# Patient Record
Sex: FEMALE | Race: OTHER | HISPANIC OR LATINO | URBAN - METROPOLITAN AREA
[De-identification: names, ages, dates, MRNs, and addresses within clinical notes are randomized per-mention and may not be internally consistent; named-entity substitution may affect disease eponyms.]

---

## 2018-01-17 ENCOUNTER — EMERGENCY (EMERGENCY)
Facility: HOSPITAL | Age: 58
LOS: 1 days | Discharge: HOME | End: 2018-01-17
Attending: EMERGENCY MEDICINE | Admitting: EMERGENCY MEDICINE
Payer: MEDICAID

## 2018-01-17 VITALS
RESPIRATION RATE: 18 BRPM | HEART RATE: 87 BPM | WEIGHT: 158.07 LBS | OXYGEN SATURATION: 100 % | TEMPERATURE: 98 F | DIASTOLIC BLOOD PRESSURE: 74 MMHG | SYSTOLIC BLOOD PRESSURE: 119 MMHG

## 2018-01-17 VITALS
RESPIRATION RATE: 18 BRPM | DIASTOLIC BLOOD PRESSURE: 86 MMHG | HEART RATE: 86 BPM | OXYGEN SATURATION: 100 % | SYSTOLIC BLOOD PRESSURE: 123 MMHG | TEMPERATURE: 98 F

## 2018-01-17 PROCEDURE — 99283 EMERGENCY DEPT VISIT LOW MDM: CPT

## 2018-01-17 RX ADMIN — Medication 40 MILLIGRAM(S): at 18:10

## 2018-01-17 NOTE — ED ADULT NURSE NOTE - CHPI ED SYMPTOMS NEG
no shortness of breath/no swelling of face, tongue/no difficulty swallowing/no throat itching/no difficulty breathing/no nausea/no vomiting/no cough/no wheezing

## 2018-01-17 NOTE — ED ADULT NURSE NOTE - OBJECTIVE STATEMENT
58 y/o female, a&o x3, c/o itchy red rash on abdomen x 1 week. Patient states she had a similar rash in 3 months prior which resolved. This episode began 1 week prior after changing to Dove body soap. States only known allergy are to cherries and peaches. Denies headache, weakness, dizziness, nausea, vomiting, fevers, chills, difficulty swallowing, or SOB. Breathing even, full, unlabored, bilat lungs clear, no stridor. Abdomen soft, nontender, nondistended. Diffuse red rash of abdomen, under breasts, medial forearms, anterior thighs, and groin which patient describes as being very itchy. Patient resting on stretcher. Advised of plan of care.

## 2018-01-17 NOTE — ED PROVIDER NOTE - MEDICAL DECISION MAKING DETAILS
Allergic reaction of unknown etiology. Oral steroids. He stopped topical steroids. Patient will be referred to allergy and immunology for further evaluation. Patient will be prescribed oral steroids for 5 days. She may continue taking Benadryl by mouth.

## 2018-01-17 NOTE — ED ADULT TRIAGE NOTE - CHIEF COMPLAINT QUOTE
abd redness/itchiness - no new meds, no change in lifestyle - denies CP/SOB/diff breathing - no resp distress noted, airway patent, pt speaking coherently

## 2018-01-17 NOTE — ED PROVIDER NOTE - OBJECTIVE STATEMENT
Attending note. Patient was seen in the fast track room #5. This is complaining of Hg rash on her abdomen and under her breasts and chest for approximately 2 weeks. Patient had similar episode in November which was treated with steroids. Patient has been applying cortisone cream as well as using Caladryl and taking Benadryl by mouth. Patient denies any respiratory symptoms. Patient has no joint pain, recent travel or ill contacts.

## 2018-01-17 NOTE — ED PROVIDER NOTE - PHYSICAL EXAMINATION
Attending note. Patient is alert and in no acute distress. Scalp is normal. Back is normal. This has erythema regular rash over the anterior chest and abdomen as well as underneath the breasts bilaterally. There is also rash on the arms bilaterally and proximal thighs. Lungs are clear and equal bilaterally. There are no wheezes rales or rhonchi. Oropharynx is normal. There is no adenopathy. Pupils are 3 mm equal reactive.

## 2018-01-31 ENCOUNTER — APPOINTMENT (OUTPATIENT)
Dept: PEDIATRIC ALLERGY IMMUNOLOGY | Facility: CLINIC | Age: 58
End: 2018-01-31
Payer: MEDICAID

## 2018-01-31 VITALS
SYSTOLIC BLOOD PRESSURE: 120 MMHG | WEIGHT: 168.13 LBS | BODY MASS INDEX: 36.27 KG/M2 | OXYGEN SATURATION: 98 % | HEART RATE: 81 BPM | DIASTOLIC BLOOD PRESSURE: 83 MMHG | HEIGHT: 57 IN

## 2018-01-31 DIAGNOSIS — T78.1XXA OTHER ADVERSE FOOD REACTIONS, NOT ELSEWHERE CLASSIFIED, INITIAL ENCOUNTER: ICD-10-CM

## 2018-01-31 DIAGNOSIS — J30.9 ALLERGIC RHINITIS, UNSPECIFIED: ICD-10-CM

## 2018-01-31 PROCEDURE — 95004 PERQ TESTS W/ALRGNC XTRCS: CPT

## 2018-01-31 PROCEDURE — 99204 OFFICE O/P NEW MOD 45 MIN: CPT | Mod: 25

## 2018-02-05 PROBLEM — T78.1XXA ORAL ALLERGY SYNDROME: Status: ACTIVE | Noted: 2018-02-05

## 2018-02-07 ENCOUNTER — APPOINTMENT (OUTPATIENT)
Dept: DERMATOLOGY | Facility: CLINIC | Age: 58
End: 2018-02-07
Payer: MEDICAID

## 2018-02-07 VITALS
HEIGHT: 57 IN | SYSTOLIC BLOOD PRESSURE: 112 MMHG | BODY MASS INDEX: 36.03 KG/M2 | DIASTOLIC BLOOD PRESSURE: 70 MMHG | WEIGHT: 167 LBS

## 2018-02-07 DIAGNOSIS — L30.4 ERYTHEMA INTERTRIGO: ICD-10-CM

## 2018-02-07 DIAGNOSIS — Z91.89 OTHER SPECIFIED PERSONAL RISK FACTORS, NOT ELSEWHERE CLASSIFIED: ICD-10-CM

## 2018-02-07 DIAGNOSIS — Z56.0 UNEMPLOYMENT, UNSPECIFIED: ICD-10-CM

## 2018-02-07 PROCEDURE — 99202 OFFICE O/P NEW SF 15 MIN: CPT

## 2018-02-07 SDOH — ECONOMIC STABILITY - INCOME SECURITY: UNEMPLOYMENT, UNSPECIFIED: Z56.0

## 2018-11-07 ENCOUNTER — OUTPATIENT (OUTPATIENT)
Dept: OUTPATIENT SERVICES | Facility: HOSPITAL | Age: 58
LOS: 1 days | End: 2018-11-07
Payer: COMMERCIAL

## 2018-11-07 ENCOUNTER — APPOINTMENT (OUTPATIENT)
Dept: INTERNAL MEDICINE | Facility: CLINIC | Age: 58
End: 2018-11-07
Payer: COMMERCIAL

## 2018-11-07 VITALS
DIASTOLIC BLOOD PRESSURE: 70 MMHG | SYSTOLIC BLOOD PRESSURE: 120 MMHG | WEIGHT: 152 LBS | BODY MASS INDEX: 32.79 KG/M2 | HEIGHT: 57 IN

## 2018-11-07 DIAGNOSIS — Z09 ENCOUNTER FOR FOLLOW-UP EXAMINATION AFTER COMPLETED TREATMENT FOR CONDITIONS OTHER THAN MALIGNANT NEOPLASM: ICD-10-CM

## 2018-11-07 DIAGNOSIS — I10 ESSENTIAL (PRIMARY) HYPERTENSION: ICD-10-CM

## 2018-11-07 PROCEDURE — 99213 OFFICE O/P EST LOW 20 MIN: CPT | Mod: GE

## 2018-11-07 RX ORDER — CETIRIZINE HYDROCHLORIDE 10 MG/1
10 TABLET, FILM COATED ORAL DAILY
Qty: 30 | Refills: 3 | Status: DISCONTINUED | COMMUNITY
Start: 2018-01-31 | End: 2018-11-07

## 2018-11-07 RX ORDER — KETOTIFEN FUMARATE 0.25 MG/ML
0.03 SOLUTION/ DROPS OPHTHALMIC
Qty: 1 | Refills: 3 | Status: DISCONTINUED | COMMUNITY
Start: 2018-02-05 | End: 2018-11-07

## 2018-11-07 RX ORDER — TRIAMCINOLONE ACETONIDE 1 MG/G
0.1 CREAM TOPICAL TWICE DAILY
Qty: 1 | Refills: 3 | Status: DISCONTINUED | COMMUNITY
Start: 2018-01-31 | End: 2018-11-07

## 2018-11-07 RX ORDER — PREDNISONE 50 MG/1
TABLET ORAL
Refills: 0 | Status: DISCONTINUED | COMMUNITY
End: 2018-11-07

## 2018-11-07 RX ORDER — FLUTICASONE PROPIONATE 50 UG/1
50 SPRAY, METERED NASAL DAILY
Qty: 1 | Refills: 3 | Status: DISCONTINUED | COMMUNITY
Start: 2018-01-31 | End: 2018-11-07

## 2018-11-07 NOTE — HISTORY OF PRESENT ILLNESS
[de-identified] : 57 y/o F w/ no sig PMHx presents for her CPE. \par \par Patient reports she came in to get her usual annual physical, but also reports concern about a 15lb weight loss over the last 2-3 months. Patient reports eating better but is concerned about diabetes because it runs on her family. Also reports drinking about 8 cups of water a day which is more than her usual. \par Saw Dermatology and AI for rash and allergies symptoms respectively. Derm diagnosed rash as intertrigo in Feb. Rash still comes and goes in her groin, but has overall improved. \par Denies any F/C, CP, change in energy.

## 2018-11-07 NOTE — HEALTH RISK ASSESSMENT
[Two or more falls in past year] : Patient reported two or more falls in the past year [0] : 2) Feeling down, depressed, or hopeless: Not at all (0) [With Family] : lives with family [Employed] : employed [# Of Children ___] : has [unfilled] children [Good] : ~his/her~  mood as  good [] : No [[id number]] : 0 [Reports changes in hearing] : Reports no changes in hearing [Reports changes in vision] : Reports no changes in vision [Reports changes in dental health] : Reports no changes in dental health [de-identified] : c [FreeTextEntry2] :

## 2018-11-07 NOTE — PHYSICAL EXAM
[No Acute Distress] : no acute distress [Well Nourished] : well nourished [Well Developed] : well developed [Well-Appearing] : well-appearing [Normal Sclera/Conjunctiva] : normal sclera/conjunctiva [Normal Outer Ear/Nose] : the outer ears and nose were normal in appearance [Normal Oropharynx] : the oropharynx was normal [No Respiratory Distress] : no respiratory distress  [Clear to Auscultation] : lungs were clear to auscultation bilaterally [No Accessory Muscle Use] : no accessory muscle use [Normal Rate] : normal rate  [Regular Rhythm] : with a regular rhythm [Normal S1, S2] : normal S1 and S2 [No Murmur] : no murmur heard [No Carotid Bruits] : no carotid bruits [No Abdominal Bruit] : a ~M bruit was not heard ~T in the abdomen [No Varicosities] : no varicosities [Pedal Pulses Present] : the pedal pulses are present [No Edema] : there was no peripheral edema [No Extremity Clubbing/Cyanosis] : no extremity clubbing/cyanosis [No Palpable Aorta] : no palpable aorta [Soft] : abdomen soft [Non Tender] : non-tender [Non-distended] : non-distended [Normal Bowel Sounds] : normal bowel sounds [No CVA Tenderness] : no CVA  tenderness [No Spinal Tenderness] : no spinal tenderness [No Joint Swelling] : no joint swelling [Grossly Normal Strength/Tone] : grossly normal strength/tone [No Rash] : no rash [Normal Gait] : normal gait [Coordination Grossly Intact] : coordination grossly intact [No Focal Deficits] : no focal deficits [Normal Affect] : the affect was normal [Alert and Oriented x3] : oriented to person, place, and time [Normal Insight/Judgement] : insight and judgment were intact

## 2018-11-07 NOTE — REVIEW OF SYSTEMS
[Itching] : Itching [Skin Rash] : skin rash [Fever] : no fever [Chills] : no chills [Fatigue] : no fatigue [Night Sweats] : no night sweats [Discharge] : no discharge [Pain] : no pain [Earache] : no earache [Hearing Loss] : no hearing loss [Sore Throat] : no sore throat [Chest Pain] : no chest pain [Palpitations] : no palpitations [Leg Claudication] : no leg claudication [Lower Ext Edema] : no lower extremity edema [Orthopnea] : no orthopnea [Shortness Of Breath] : no shortness of breath [Wheezing] : no wheezing [Cough] : no cough [Abdominal Pain] : no abdominal pain [Nausea] : no nausea [Constipation] : no constipation [Vomiting] : no vomiting [Heartburn] : no heartburn [Dysuria] : no dysuria [Incontinence] : no incontinence [Hematuria] : no hematuria [Joint Pain] : no joint pain [Joint Stiffness] : no joint stiffness [Muscle Pain] : no muscle pain [Headache] : no headache [Dizziness] : no dizziness [Fainting] : no fainting

## 2018-11-08 ENCOUNTER — RESULT REVIEW (OUTPATIENT)
Age: 58
End: 2018-11-08

## 2018-11-08 LAB
ALBUMIN SERPL ELPH-MCNC: 4.1 G/DL
ALP BLD-CCNC: 83 U/L
ALT SERPL-CCNC: 26 U/L
ANION GAP SERPL CALC-SCNC: 13 MMOL/L
AST SERPL-CCNC: 16 U/L
BASOPHILS # BLD AUTO: 0.06 K/UL
BASOPHILS NFR BLD AUTO: 0.9 %
BILIRUB SERPL-MCNC: 0.4 MG/DL
BUN SERPL-MCNC: 14 MG/DL
CALCIUM SERPL-MCNC: 9.7 MG/DL
CHLORIDE SERPL-SCNC: 99 MMOL/L
CHOLEST SERPL-MCNC: 209 MG/DL
CHOLEST/HDLC SERPL: 3.3 RATIO
CO2 SERPL-SCNC: 28 MMOL/L
CREAT SERPL-MCNC: 0.54 MG/DL
EOSINOPHIL # BLD AUTO: 0.38 K/UL
EOSINOPHIL NFR BLD AUTO: 5.9 %
GLUCOSE SERPL-MCNC: 276 MG/DL
HBA1C MFR BLD HPLC: 12.1 %
HCT VFR BLD CALC: 46.5 %
HDLC SERPL-MCNC: 64 MG/DL
HGB BLD-MCNC: 15.8 G/DL
IMM GRANULOCYTES NFR BLD AUTO: 0.2 %
LDLC SERPL CALC-MCNC: 132 MG/DL
LYMPHOCYTES # BLD AUTO: 2.88 K/UL
LYMPHOCYTES NFR BLD AUTO: 45.1 %
MAN DIFF?: NORMAL
MCHC RBC-ENTMCNC: 29.6 PG
MCHC RBC-ENTMCNC: 34 GM/DL
MCV RBC AUTO: 87.2 FL
MONOCYTES # BLD AUTO: 0.51 K/UL
MONOCYTES NFR BLD AUTO: 8 %
NEUTROPHILS # BLD AUTO: 2.55 K/UL
NEUTROPHILS NFR BLD AUTO: 39.9 %
PLATELET # BLD AUTO: 273 K/UL
POTASSIUM SERPL-SCNC: 4.5 MMOL/L
PROT SERPL-MCNC: 6.3 G/DL
RBC # BLD: 5.33 M/UL
RBC # FLD: 12.8 %
SODIUM SERPL-SCNC: 140 MMOL/L
TRIGL SERPL-MCNC: 64 MG/DL
WBC # FLD AUTO: 6.39 K/UL

## 2018-11-08 RX ORDER — INSULIN GLARGINE 100 [IU]/ML
100 INJECTION, SOLUTION SUBCUTANEOUS
Qty: 3 | Refills: 1 | Status: DISCONTINUED | COMMUNITY
Start: 2018-11-07 | End: 2018-11-08

## 2018-11-09 RX ORDER — INSULIN LISPRO 100 [IU]/ML
100 INJECTION, SOLUTION INTRAVENOUS; SUBCUTANEOUS
Qty: 2 | Refills: 1 | Status: DISCONTINUED | COMMUNITY
Start: 2018-11-07 | End: 2018-11-09

## 2018-11-12 DIAGNOSIS — E11.65 TYPE 2 DIABETES MELLITUS WITH HYPERGLYCEMIA: ICD-10-CM

## 2018-11-12 DIAGNOSIS — E66.9 OBESITY, UNSPECIFIED: ICD-10-CM

## 2018-11-13 ENCOUNTER — LABORATORY RESULT (OUTPATIENT)
Age: 58
End: 2018-11-13

## 2018-11-13 ENCOUNTER — APPOINTMENT (OUTPATIENT)
Dept: INTERNAL MEDICINE | Facility: CLINIC | Age: 58
End: 2018-11-13

## 2018-11-13 PROCEDURE — 82274 ASSAY TEST FOR BLOOD FECAL: CPT

## 2018-11-13 PROCEDURE — G0463: CPT

## 2018-11-14 ENCOUNTER — APPOINTMENT (OUTPATIENT)
Dept: INTERNAL MEDICINE | Facility: CLINIC | Age: 58
End: 2018-11-14
Payer: COMMERCIAL

## 2018-11-14 ENCOUNTER — OUTPATIENT (OUTPATIENT)
Dept: OUTPATIENT SERVICES | Facility: HOSPITAL | Age: 58
LOS: 1 days | End: 2018-11-14
Payer: COMMERCIAL

## 2018-11-14 VITALS
HEIGHT: 57 IN | WEIGHT: 152 LBS | DIASTOLIC BLOOD PRESSURE: 70 MMHG | BODY MASS INDEX: 32.79 KG/M2 | SYSTOLIC BLOOD PRESSURE: 110 MMHG

## 2018-11-14 DIAGNOSIS — I10 ESSENTIAL (PRIMARY) HYPERTENSION: ICD-10-CM

## 2018-11-14 PROCEDURE — 82962 GLUCOSE BLOOD TEST: CPT

## 2018-11-14 PROCEDURE — G0463: CPT

## 2018-11-14 PROCEDURE — 99214 OFFICE O/P EST MOD 30 MIN: CPT | Mod: GC

## 2018-11-16 LAB — GLUCOSE BLDC GLUCOMTR-MCNC: 135

## 2018-11-20 NOTE — HISTORY OF PRESENT ILLNESS
[FreeTextEntry8] : Pt is a 57yo F with DM (on metformin, basaglar 16units qhs, Admelog 6 units TID), last A1c >12 November 2018) presenting for blood work results and FS check. No complaints at present.

## 2018-11-20 NOTE — PHYSICAL EXAM

## 2018-11-20 NOTE — PLAN
[FreeTextEntry1] : Diabetes\par - FS was 135 today\par - FS range 70s-100s at home after breakfast\par - continue with metformin 500mg BID\par - c/w basaglar 18unites at bedtime\par - c/w admelog 6 units TID\par - return in 5 weeks for FS check\par \par Fungal infection foot\par - given ketoconazole cream\par \par Hyperlipidemia\par - started on atorvastatin 10mg QD

## 2018-11-20 NOTE — ASSESSMENT
[FreeTextEntry1] : Pt is a 59yo F with DM (on metformin, last A1c >14 November 2018) presenting for blood work results and FS check. No complaints at present.

## 2018-11-23 DIAGNOSIS — H10.13 ACUTE ATOPIC CONJUNCTIVITIS, BILATERAL: ICD-10-CM

## 2018-11-23 DIAGNOSIS — E11.65 TYPE 2 DIABETES MELLITUS WITH HYPERGLYCEMIA: ICD-10-CM

## 2018-11-23 DIAGNOSIS — E78.5 HYPERLIPIDEMIA, UNSPECIFIED: ICD-10-CM

## 2018-11-26 LAB — OB PNL STL IA: NEGATIVE — SIGNIFICANT CHANGE UP

## 2018-12-11 ENCOUNTER — APPOINTMENT (OUTPATIENT)
Dept: INTERNAL MEDICINE | Facility: CLINIC | Age: 58
End: 2018-12-11

## 2018-12-12 ENCOUNTER — RESULT CHARGE (OUTPATIENT)
Age: 58
End: 2018-12-12

## 2018-12-12 ENCOUNTER — APPOINTMENT (OUTPATIENT)
Dept: INTERNAL MEDICINE | Facility: CLINIC | Age: 58
End: 2018-12-12
Payer: COMMERCIAL

## 2018-12-12 ENCOUNTER — OUTPATIENT (OUTPATIENT)
Dept: OUTPATIENT SERVICES | Facility: HOSPITAL | Age: 58
LOS: 1 days | End: 2018-12-12
Payer: COMMERCIAL

## 2018-12-12 VITALS
DIASTOLIC BLOOD PRESSURE: 70 MMHG | WEIGHT: 152 LBS | BODY MASS INDEX: 32.79 KG/M2 | SYSTOLIC BLOOD PRESSURE: 110 MMHG | HEIGHT: 57 IN

## 2018-12-12 DIAGNOSIS — I10 ESSENTIAL (PRIMARY) HYPERTENSION: ICD-10-CM

## 2018-12-12 LAB — GLUCOSE BLDC GLUCOMTR-MCNC: 81

## 2018-12-12 PROCEDURE — G0463: CPT

## 2018-12-12 PROCEDURE — 99213 OFFICE O/P EST LOW 20 MIN: CPT | Mod: GE

## 2018-12-12 RX ORDER — HYDROCORTISONE 25 MG/G
2.5 CREAM TOPICAL
Qty: 2 | Refills: 2 | Status: DISCONTINUED | COMMUNITY
Start: 2018-02-07 | End: 2018-12-12

## 2018-12-12 RX ORDER — KETOCONAZOLE 20 MG/G
2 CREAM TOPICAL
Qty: 1 | Refills: 2 | Status: DISCONTINUED | COMMUNITY
Start: 2018-02-07 | End: 2018-12-12

## 2018-12-13 NOTE — REVIEW OF SYSTEMS
[Fever] : no fever [Chills] : no chills [Fatigue] : no fatigue [Night Sweats] : no night sweats [Discharge] : no discharge [Pain] : no pain [Earache] : no earache [Hearing Loss] : no hearing loss [Sore Throat] : no sore throat [Chest Pain] : no chest pain [Palpitations] : no palpitations [Leg Claudication] : no leg claudication [Lower Ext Edema] : no lower extremity edema [Orthopnea] : no orthopnea [Shortness Of Breath] : no shortness of breath [Wheezing] : no wheezing [Cough] : no cough [Abdominal Pain] : no abdominal pain [Nausea] : no nausea [Constipation] : no constipation [Vomiting] : no vomiting [Heartburn] : no heartburn [Dysuria] : no dysuria [Incontinence] : no incontinence [Hematuria] : no hematuria [Joint Pain] : no joint pain [Joint Stiffness] : no joint stiffness [Muscle Pain] : no muscle pain [Itching] : no itching [Skin Rash] : no skin rash [Headache] : no headache [Dizziness] : no dizziness [Fainting] : no fainting

## 2018-12-13 NOTE — HISTORY OF PRESENT ILLNESS
[de-identified] : 59yo F with DM (on metformin, basaglar 16units qhs, Admelog 6 units TID), last A1c >12 November 2018) presenting for DM2 f/u. \par \par Checking FS at home. Yesterday was 108. Has been in low 100s usually but has had one reading in 200s. \par Patient lost her Admelog pen so hasn't used her premeal insulin for last 2 days. \par Has log book at home but hasn't been using it. \par Denies any symptoms of hypoglycemia. No N/V headache or dizziness. \par Reports energy has improved and is urinating less frequently. \par \par Fungal foot infection has improved with cream. Requesting refill.

## 2018-12-13 NOTE — PLAN
[FreeTextEntry1] : 57yo F with DM (on metformin, basaglar 16units qhs, Admelog 6 units TID), last A1c >12 November 2018) presenting for DM2 f/u. \par \par #DM2\par - FS was 81 today\par - given low FS off of premeal, concern for hypoglycemia at home given her diet changes\par - on metformin 500mg BID, will increase to 1gr BID\par - on basaglar 18unites at bedtime, will lower to 12u\par - on admelog 6 units TID, will lower to 2u or if FS>150 will take 4u\par - return in 2 weeks for FS check\par - instructed her to bring in log of FS next visit \par \par #Fungal infection foot\par - given ketoconazole cream last visit\par - gave refill today\par \par #Hyperlipidemia\par - started on atorvastatin 10mg QD, tolerating well \par \par D/w Dr. Alvarado and Dr. Martin\par RTC in 5 weeks

## 2018-12-19 ENCOUNTER — APPOINTMENT (OUTPATIENT)
Dept: INTERNAL MEDICINE | Facility: CLINIC | Age: 58
End: 2018-12-19
Payer: COMMERCIAL

## 2018-12-19 ENCOUNTER — OUTPATIENT (OUTPATIENT)
Dept: OUTPATIENT SERVICES | Facility: HOSPITAL | Age: 58
LOS: 1 days | End: 2018-12-19
Payer: COMMERCIAL

## 2018-12-19 VITALS
SYSTOLIC BLOOD PRESSURE: 120 MMHG | HEIGHT: 57 IN | BODY MASS INDEX: 32.79 KG/M2 | DIASTOLIC BLOOD PRESSURE: 70 MMHG | WEIGHT: 152 LBS

## 2018-12-19 DIAGNOSIS — I10 ESSENTIAL (PRIMARY) HYPERTENSION: ICD-10-CM

## 2018-12-19 DIAGNOSIS — E11.65 TYPE 2 DIABETES MELLITUS WITH HYPERGLYCEMIA: ICD-10-CM

## 2018-12-19 PROCEDURE — 99214 OFFICE O/P EST MOD 30 MIN: CPT | Mod: GC

## 2018-12-19 PROCEDURE — G0463: CPT

## 2018-12-20 LAB — GLUCOSE BLDC GLUCOMTR-MCNC: 203

## 2018-12-27 DIAGNOSIS — E11.65 TYPE 2 DIABETES MELLITUS WITH HYPERGLYCEMIA: ICD-10-CM

## 2019-01-06 NOTE — ASSESSMENT
[FreeTextEntry1] : Pt is a 59yo F with DM (now on metformin 1000 BID, basaglar 4 units qhs, Admelog 2 units TID - changed since November visit), last A1c >12 November 2018) presenting for FS check.

## 2019-01-06 NOTE — HISTORY OF PRESENT ILLNESS
[FreeTextEntry8] : Pt is a 59yo F with DM (now on metformin 1000 BID, basaglar 4 units qhs, Admelog 2 units TID - changed since November visit), last A1c >12 November 2018) presenting for FS check. No complaints at present. Patient states pre-meal FS in AM is 140s, Lunchtime 90s, Dinner time pre-meal 140s, and 90s after dinner. FS today in office - 203

## 2019-01-06 NOTE — PLAN
[FreeTextEntry1] : Diabetes\par -  in office\par - FS range within acceptable range\par - continue current regimen of basaglar \par - Increase Admelog to 4 units TID for premeal FS >140, otherwise c/w 2 units\par - continue to check FS TID and log in journal\par - return in January for followup visit\par

## 2019-01-08 ENCOUNTER — APPOINTMENT (OUTPATIENT)
Dept: INTERNAL MEDICINE | Facility: CLINIC | Age: 59
End: 2019-01-08

## 2019-01-16 ENCOUNTER — OUTPATIENT (OUTPATIENT)
Dept: OUTPATIENT SERVICES | Facility: HOSPITAL | Age: 59
LOS: 1 days | End: 2019-01-16
Payer: COMMERCIAL

## 2019-01-16 ENCOUNTER — APPOINTMENT (OUTPATIENT)
Dept: INTERNAL MEDICINE | Facility: CLINIC | Age: 59
End: 2019-01-16
Payer: COMMERCIAL

## 2019-01-16 VITALS
DIASTOLIC BLOOD PRESSURE: 70 MMHG | WEIGHT: 152 LBS | BODY MASS INDEX: 32.79 KG/M2 | HEIGHT: 57 IN | SYSTOLIC BLOOD PRESSURE: 120 MMHG

## 2019-01-16 DIAGNOSIS — E78.5 HYPERLIPIDEMIA, UNSPECIFIED: ICD-10-CM

## 2019-01-16 DIAGNOSIS — I10 ESSENTIAL (PRIMARY) HYPERTENSION: ICD-10-CM

## 2019-01-16 DIAGNOSIS — R73.03 PREDIABETES.: ICD-10-CM

## 2019-01-16 DIAGNOSIS — E66.9 OBESITY, UNSPECIFIED: ICD-10-CM

## 2019-01-16 DIAGNOSIS — E11.65 TYPE 2 DIABETES MELLITUS WITH HYPERGLYCEMIA: ICD-10-CM

## 2019-01-16 LAB — HBA1C MFR BLD HPLC: 7.2

## 2019-01-16 PROCEDURE — G0463: CPT

## 2019-01-16 PROCEDURE — 99213 OFFICE O/P EST LOW 20 MIN: CPT | Mod: GE

## 2019-01-16 NOTE — PHYSICAL EXAM
[No Acute Distress] : no acute distress [Well Nourished] : well nourished [Well Developed] : well developed [Well-Appearing] : well-appearing [Normal Sclera/Conjunctiva] : normal sclera/conjunctiva [Normal Outer Ear/Nose] : the outer ears and nose were normal in appearance [Normal Oropharynx] : the oropharynx was normal [No Respiratory Distress] : no respiratory distress  [Clear to Auscultation] : lungs were clear to auscultation bilaterally [No Accessory Muscle Use] : no accessory muscle use [Normal Rate] : normal rate  [Regular Rhythm] : with a regular rhythm [Normal S1, S2] : normal S1 and S2 [No Murmur] : no murmur heard [No Carotid Bruits] : no carotid bruits [No Abdominal Bruit] : a ~M bruit was not heard ~T in the abdomen [No Varicosities] : no varicosities [Pedal Pulses Present] : the pedal pulses are present [No Edema] : there was no peripheral edema [No Extremity Clubbing/Cyanosis] : no extremity clubbing/cyanosis [No Palpable Aorta] : no palpable aorta [Soft] : abdomen soft [Non Tender] : non-tender [Non-distended] : non-distended [Normal Bowel Sounds] : normal bowel sounds [No CVA Tenderness] : no CVA  tenderness [No Spinal Tenderness] : no spinal tenderness [No Joint Swelling] : no joint swelling [Grossly Normal Strength/Tone] : grossly normal strength/tone [No Rash] : no rash [Normal Gait] : normal gait [Coordination Grossly Intact] : coordination grossly intact [No Focal Deficits] : no focal deficits [Normal Affect] : the affect was normal [Alert and Oriented x3] : oriented to person, place, and time [Normal Insight/Judgement] : insight and judgment were intact [Comprehensive Foot Exam Normal] : Right and left foot were examined and both feet are normal. No ulcers in either foot. Toes are normal and with full ROM.  Normal tactile sensation with monofilament testing throughout both feet

## 2019-01-23 NOTE — PLAN
[FreeTextEntry1] : 57 y/o F with DM2 last A1c >12 November 2018) presenting for DM2 f/u. \par \par #DM2\par - A1c 7.2 today\par - c/w metformin 1gr BID\par - will stop basaglar\par - instructed to take 2 u admelog if FS is greater than 150\par - instructed her to bring in log of FS next visit \par \par #Hyperlipidemia\par - c/w atorvastatin 10mg QD, tolerating well \par \par #HCM\par - declined flu shot \par \par D/w Dr. Keenan \par RTC in 5 weeks

## 2019-01-23 NOTE — HISTORY OF PRESENT ILLNESS
[de-identified] : 57 y/o F with DM2, last A1c >12 November 2018) presenting for DM2 f/u. \par \par Patient reports lower blood sugars, so she hasn't been taking her insulin regularly. \par Reports using her basal only about 3-4 times since her last visit. Reports only taking 4 units. \par Premeal she has only taken 3 or 4 times since last visit. Took 2 units. \par Reports her blood sugars have been well controlled off of insulin. Highest blood sugar was 160 this morning. Says she has been checking twice a days. Blood sugars have been in low 100s or 90s. \par No symptoms or episodes of hypoglycemia. \par She has been taking her metformin daily.

## 2019-02-20 ENCOUNTER — LABORATORY RESULT (OUTPATIENT)
Age: 59
End: 2019-02-20

## 2019-02-20 ENCOUNTER — OUTPATIENT (OUTPATIENT)
Dept: OUTPATIENT SERVICES | Facility: HOSPITAL | Age: 59
LOS: 1 days | End: 2019-02-20
Payer: COMMERCIAL

## 2019-02-20 ENCOUNTER — APPOINTMENT (OUTPATIENT)
Dept: INTERNAL MEDICINE | Facility: CLINIC | Age: 59
End: 2019-02-20
Payer: COMMERCIAL

## 2019-02-20 VITALS
HEIGHT: 57 IN | WEIGHT: 146 LBS | SYSTOLIC BLOOD PRESSURE: 120 MMHG | DIASTOLIC BLOOD PRESSURE: 70 MMHG | BODY MASS INDEX: 31.5 KG/M2

## 2019-02-20 DIAGNOSIS — I10 ESSENTIAL (PRIMARY) HYPERTENSION: ICD-10-CM

## 2019-02-20 LAB — GLUCOSE BLDC GLUCOMTR-MCNC: 105

## 2019-02-20 PROCEDURE — 82043 UR ALBUMIN QUANTITATIVE: CPT

## 2019-02-20 PROCEDURE — 99213 OFFICE O/P EST LOW 20 MIN: CPT | Mod: GE

## 2019-02-20 PROCEDURE — G0463: CPT

## 2019-02-20 RX ORDER — INSULIN GLARGINE 100 [IU]/ML
100 INJECTION, SOLUTION SUBCUTANEOUS
Qty: 3 | Refills: 2 | Status: DISCONTINUED | COMMUNITY
Start: 2018-11-08 | End: 2019-02-20

## 2019-02-21 LAB
CREAT ?TM UR-MCNC: 108 MG/DL — SIGNIFICANT CHANGE UP
MICROALBUMIN UR-MCNC: <1.2 MG/DL — SIGNIFICANT CHANGE UP
MICROALBUMIN/CREAT UR-RTO: SIGNIFICANT CHANGE UP MG/G (ref 0–30)

## 2019-02-22 NOTE — PLAN
[FreeTextEntry1] : 57 y/o F with DM2 last A1c >12 November 2018) presenting for DM2 f/u. \par \par #DM2\par - A1c improved to 7.2\par - c/w metformin 1gr BID\par - instructed to take 2 u admelog if FS is greater than 150\par \par #Hyperlipidemia\par - c/w atorvastatin 10mg QD, tolerating well \par \par #HCM\par - declined flu shot \par - gave new referrals for dentist, GYN, mammography, optho\par - filled out DMV form and mail to patient and also faxed copy to DMV\par \par D/w Dr. Foreman \par RTC in 2 months
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
## 2019-02-26 DIAGNOSIS — E11.65 TYPE 2 DIABETES MELLITUS WITH HYPERGLYCEMIA: ICD-10-CM

## 2019-05-01 ENCOUNTER — OUTPATIENT (OUTPATIENT)
Dept: OUTPATIENT SERVICES | Facility: HOSPITAL | Age: 59
LOS: 1 days | End: 2019-05-01
Payer: COMMERCIAL

## 2019-05-01 ENCOUNTER — RESULT CHARGE (OUTPATIENT)
Age: 59
End: 2019-05-01

## 2019-05-01 ENCOUNTER — APPOINTMENT (OUTPATIENT)
Dept: INTERNAL MEDICINE | Facility: CLINIC | Age: 59
End: 2019-05-01
Payer: COMMERCIAL

## 2019-05-01 VITALS
WEIGHT: 148 LBS | BODY MASS INDEX: 31.93 KG/M2 | HEART RATE: 90 BPM | SYSTOLIC BLOOD PRESSURE: 115 MMHG | OXYGEN SATURATION: 97 % | DIASTOLIC BLOOD PRESSURE: 80 MMHG | HEIGHT: 57 IN

## 2019-05-01 DIAGNOSIS — I10 ESSENTIAL (PRIMARY) HYPERTENSION: ICD-10-CM

## 2019-05-01 DIAGNOSIS — Z87.19 PERSONAL HISTORY OF OTHER DISEASES OF THE DIGESTIVE SYSTEM: ICD-10-CM

## 2019-05-01 DIAGNOSIS — H10.13 ACUTE ATOPIC CONJUNCTIVITIS, BILATERAL: ICD-10-CM

## 2019-05-01 DIAGNOSIS — Z87.2 PERSONAL HISTORY OF DISEASES OF THE SKIN AND SUBCUTANEOUS TISSUE: ICD-10-CM

## 2019-05-01 LAB — HBA1C MFR BLD HPLC: 6.8

## 2019-05-01 PROCEDURE — G0463: CPT

## 2019-05-01 PROCEDURE — 90732 PPSV23 VACC 2 YRS+ SUBQ/IM: CPT

## 2019-05-01 PROCEDURE — 99213 OFFICE O/P EST LOW 20 MIN: CPT | Mod: GE

## 2019-05-01 PROCEDURE — 83036 HEMOGLOBIN GLYCOSYLATED A1C: CPT

## 2019-05-01 PROCEDURE — G0009: CPT

## 2019-05-01 NOTE — PHYSICAL EXAM
[Well Developed] : well developed [No Acute Distress] : no acute distress [Well Nourished] : well nourished [Well-Appearing] : well-appearing [Normal Sclera/Conjunctiva] : normal sclera/conjunctiva [Normal Outer Ear/Nose] : the outer ears and nose were normal in appearance [Normal Oropharynx] : the oropharynx was normal [Clear to Auscultation] : lungs were clear to auscultation bilaterally [No Respiratory Distress] : no respiratory distress  [No Accessory Muscle Use] : no accessory muscle use [Regular Rhythm] : with a regular rhythm [Normal Rate] : normal rate  [Normal S1, S2] : normal S1 and S2 [No Carotid Bruits] : no carotid bruits [No Murmur] : no murmur heard [No Abdominal Bruit] : a ~M bruit was not heard ~T in the abdomen [No Varicosities] : no varicosities [Pedal Pulses Present] : the pedal pulses are present [No Palpable Aorta] : no palpable aorta [No Extremity Clubbing/Cyanosis] : no extremity clubbing/cyanosis [Soft] : abdomen soft [No Edema] : there was no peripheral edema [Non Tender] : non-tender [Normal Bowel Sounds] : normal bowel sounds [Non-distended] : non-distended [No CVA Tenderness] : no CVA  tenderness [No Spinal Tenderness] : no spinal tenderness [No Rash] : no rash [No Joint Swelling] : no joint swelling [Grossly Normal Strength/Tone] : grossly normal strength/tone [Normal Gait] : normal gait [Coordination Grossly Intact] : coordination grossly intact [Alert and Oriented x3] : oriented to person, place, and time [Normal Insight/Judgement] : insight and judgment were intact [Normal Affect] : the affect was normal [No Focal Deficits] : no focal deficits

## 2019-05-07 NOTE — HISTORY OF PRESENT ILLNESS
[de-identified] : 57 y/o F with DM2, (A1c >12 11/2018 now improved to 7.2 1/2019) presenting for DM2 f/u. \par \par Patient reports feeling well. She reports 2 episodes of FS greater than 200 since last visit, but otherwise FS have been well controlled in low 100s. Patient brought in log which showed controlled FS but occasional evening FS ranging to 140s to 160s. Also reports one episode of FS of 62 in the morning but she was asymptomatic. \par \par Patient also reports her mother passed away recently 2/2 complications from diabetes. She reports doing ok.

## 2019-05-07 NOTE — REVIEW OF SYSTEMS
[Fever] : no fever [Fatigue] : no fatigue [Chills] : no chills [Pain] : no pain [Night Sweats] : no night sweats [Discharge] : no discharge [Hearing Loss] : no hearing loss [Earache] : no earache [Sore Throat] : no sore throat [Chest Pain] : no chest pain [Palpitations] : no palpitations [Leg Claudication] : no leg claudication [Lower Ext Edema] : no lower extremity edema [Orthopnea] : no orthopnea [Cough] : no cough [Shortness Of Breath] : no shortness of breath [Wheezing] : no wheezing [Nausea] : no nausea [Abdominal Pain] : no abdominal pain [Vomiting] : no vomiting [Constipation] : no constipation [Heartburn] : no heartburn [Dysuria] : no dysuria [Incontinence] : no incontinence [Hematuria] : no hematuria [Joint Pain] : no joint pain [Joint Stiffness] : no joint stiffness [Muscle Pain] : no muscle pain [Itching] : no itching [Skin Rash] : no skin rash [Headache] : no headache [Dizziness] : no dizziness [Fainting] : no fainting

## 2019-05-07 NOTE — PLAN
[FreeTextEntry1] : 57 y/o F with DM2 last A1c >12 November 2018) presenting for DM2 f/u. \par \par #DM2\par - A1c improved to 6.8\par - c/w metformin 1gr BID\par - patient was taking 2u admelog if FS is greater than 200, but explained she didn't need to anymore \par \par #Hyperlipidemia\par - c/w atorvastatin 10mg QD, tolerating well \par \par #Obesity \par - discussed diet and exercise \par \par #HCM\par - pneumococcal 23 vaccination today \par \par D/w Dr. Keenan \par RTC in 3 months

## 2019-05-09 DIAGNOSIS — E11.65 TYPE 2 DIABETES MELLITUS WITH HYPERGLYCEMIA: ICD-10-CM

## 2019-05-09 DIAGNOSIS — E66.9 OBESITY, UNSPECIFIED: ICD-10-CM

## 2021-03-16 ENCOUNTER — NON-APPOINTMENT (OUTPATIENT)
Age: 61
End: 2021-03-16

## 2021-03-16 RX ORDER — INSULIN LISPRO 100 U/ML
100 INJECTION, SOLUTION SUBCUTANEOUS
Qty: 1 | Refills: 3 | Status: DISCONTINUED | COMMUNITY
Start: 2018-11-09 | End: 2021-03-16

## 2021-03-30 ENCOUNTER — LABORATORY RESULT (OUTPATIENT)
Age: 61
End: 2021-03-30

## 2021-03-30 ENCOUNTER — OUTPATIENT (OUTPATIENT)
Dept: OUTPATIENT SERVICES | Facility: HOSPITAL | Age: 61
LOS: 1 days | End: 2021-03-30
Payer: MEDICAID

## 2021-03-30 ENCOUNTER — APPOINTMENT (OUTPATIENT)
Dept: INTERNAL MEDICINE | Facility: CLINIC | Age: 61
End: 2021-03-30
Payer: MEDICAID

## 2021-03-30 VITALS
OXYGEN SATURATION: 98 % | HEART RATE: 80 BPM | SYSTOLIC BLOOD PRESSURE: 132 MMHG | WEIGHT: 150 LBS | BODY MASS INDEX: 32.36 KG/M2 | DIASTOLIC BLOOD PRESSURE: 80 MMHG | HEIGHT: 57 IN

## 2021-03-30 DIAGNOSIS — I10 ESSENTIAL (PRIMARY) HYPERTENSION: ICD-10-CM

## 2021-03-30 PROCEDURE — 82306 VITAMIN D 25 HYDROXY: CPT

## 2021-03-30 PROCEDURE — 80061 LIPID PANEL: CPT

## 2021-03-30 PROCEDURE — 99396 PREV VISIT EST AGE 40-64: CPT | Mod: GC

## 2021-03-30 PROCEDURE — 86803 HEPATITIS C AB TEST: CPT

## 2021-03-30 PROCEDURE — 87086 URINE CULTURE/COLONY COUNT: CPT

## 2021-03-30 PROCEDURE — 87800 DETECT AGNT MULT DNA DIREC: CPT

## 2021-03-30 PROCEDURE — 84443 ASSAY THYROID STIM HORMONE: CPT

## 2021-03-30 PROCEDURE — 81001 URINALYSIS AUTO W/SCOPE: CPT

## 2021-03-30 PROCEDURE — 80053 COMPREHEN METABOLIC PANEL: CPT

## 2021-03-30 PROCEDURE — G0463: CPT

## 2021-03-30 PROCEDURE — 85027 COMPLETE CBC AUTOMATED: CPT

## 2021-03-30 PROCEDURE — 83036 HEMOGLOBIN GLYCOSYLATED A1C: CPT

## 2021-03-30 RX ORDER — KETOCONAZOLE 20 MG/G
2 CREAM TOPICAL TWICE DAILY
Qty: 1 | Refills: 2 | Status: DISCONTINUED | COMMUNITY
Start: 2018-11-14 | End: 2021-03-30

## 2021-03-30 RX ORDER — ATORVASTATIN CALCIUM 10 MG/1
10 TABLET, FILM COATED ORAL DAILY
Qty: 90 | Refills: 1 | Status: DISCONTINUED | COMMUNITY
Start: 2018-11-14 | End: 2021-03-30

## 2021-03-31 ENCOUNTER — LABORATORY RESULT (OUTPATIENT)
Age: 61
End: 2021-03-31

## 2021-03-31 LAB
24R-OH-CALCIDIOL SERPL-MCNC: 28.5 NG/ML — LOW (ref 30–80)
ALBUMIN SERPL ELPH-MCNC: 4.1 G/DL — SIGNIFICANT CHANGE UP (ref 3.3–5)
ALP SERPL-CCNC: 99 U/L — SIGNIFICANT CHANGE UP (ref 40–120)
ALT FLD-CCNC: 27 U/L — SIGNIFICANT CHANGE UP (ref 10–45)
ANION GAP SERPL CALC-SCNC: 12 MMOL/L — SIGNIFICANT CHANGE UP (ref 5–17)
APPEARANCE UR: ABNORMAL
AST SERPL-CCNC: 20 U/L — SIGNIFICANT CHANGE UP (ref 10–40)
BACTERIA # UR AUTO: NEGATIVE — SIGNIFICANT CHANGE UP
BILIRUB SERPL-MCNC: 0.2 MG/DL — SIGNIFICANT CHANGE UP (ref 0.2–1.2)
BILIRUB UR-MCNC: NEGATIVE — SIGNIFICANT CHANGE UP
BUN SERPL-MCNC: 23 MG/DL — SIGNIFICANT CHANGE UP (ref 7–23)
CALCIUM SERPL-MCNC: 10 MG/DL — SIGNIFICANT CHANGE UP (ref 8.4–10.5)
CANDIDA AB TITR SER: SIGNIFICANT CHANGE UP
CHLORIDE SERPL-SCNC: 100 MMOL/L — SIGNIFICANT CHANGE UP (ref 96–108)
CHOLEST SERPL-MCNC: 225 MG/DL — HIGH
CO2 SERPL-SCNC: 25 MMOL/L — SIGNIFICANT CHANGE UP (ref 22–31)
COD CRY URNS QL: ABNORMAL
COLOR SPEC: YELLOW — SIGNIFICANT CHANGE UP
CREAT SERPL-MCNC: 0.46 MG/DL — LOW (ref 0.5–1.3)
DIFF PNL FLD: NEGATIVE — SIGNIFICANT CHANGE UP
EPI CELLS # UR: 5 /HPF — SIGNIFICANT CHANGE UP (ref 0–5)
G VAGINALIS DNA SPEC QL NAA+PROBE: DETECTED
GLUCOSE SERPL-MCNC: 185 MG/DL — HIGH (ref 70–99)
GLUCOSE UR QL: ABNORMAL
HCT VFR BLD CALC: 49 % — HIGH (ref 34.5–45)
HCV AB S/CO SERPL IA: 0.05 S/CO — SIGNIFICANT CHANGE UP (ref 0–0.99)
HCV AB SERPL-IMP: SIGNIFICANT CHANGE UP
HDLC SERPL-MCNC: 61 MG/DL — SIGNIFICANT CHANGE UP
HGB BLD-MCNC: 16.3 G/DL — HIGH (ref 11.5–15.5)
HYALINE CASTS # UR AUTO: 0 /LPF — SIGNIFICANT CHANGE UP (ref 0–7)
KETONES UR-MCNC: ABNORMAL
LEUKOCYTE ESTERASE UR-ACNC: ABNORMAL
LIPID PNL WITH DIRECT LDL SERPL: 145 MG/DL — HIGH
MCHC RBC-ENTMCNC: 29.9 PG — SIGNIFICANT CHANGE UP (ref 27–34)
MCHC RBC-ENTMCNC: 33.3 GM/DL — SIGNIFICANT CHANGE UP (ref 32–36)
MCV RBC AUTO: 89.7 FL — SIGNIFICANT CHANGE UP (ref 80–100)
NITRITE UR-MCNC: NEGATIVE — SIGNIFICANT CHANGE UP
NON HDL CHOLESTEROL: 164 MG/DL — HIGH
PH UR: 5.5 — SIGNIFICANT CHANGE UP (ref 5–8)
PLATELET # BLD AUTO: 290 K/UL — SIGNIFICANT CHANGE UP (ref 150–400)
POTASSIUM SERPL-MCNC: 4.1 MMOL/L — SIGNIFICANT CHANGE UP (ref 3.5–5.3)
POTASSIUM SERPL-SCNC: 4.1 MMOL/L — SIGNIFICANT CHANGE UP (ref 3.5–5.3)
PROT SERPL-MCNC: 6 G/DL — SIGNIFICANT CHANGE UP (ref 6–8.3)
PROT UR-MCNC: ABNORMAL
RBC # BLD: 5.46 M/UL — HIGH (ref 3.8–5.2)
RBC # FLD: 12.3 % — SIGNIFICANT CHANGE UP (ref 10.3–14.5)
RBC CASTS # UR COMP ASSIST: 4 /HPF — SIGNIFICANT CHANGE UP (ref 0–4)
SODIUM SERPL-SCNC: 137 MMOL/L — SIGNIFICANT CHANGE UP (ref 135–145)
SP GR SPEC: 1.04 — HIGH (ref 1.01–1.02)
T VAGINALIS SPEC QL WET PREP: SIGNIFICANT CHANGE UP
T4 FREE+ TSH PNL SERPL: 2.84 UIU/ML — SIGNIFICANT CHANGE UP (ref 0.27–4.2)
TRIGL SERPL-MCNC: 92 MG/DL — SIGNIFICANT CHANGE UP
UROBILINOGEN FLD QL: SIGNIFICANT CHANGE UP
WBC # BLD: 7.64 K/UL — SIGNIFICANT CHANGE UP (ref 3.8–10.5)
WBC # FLD AUTO: 7.64 K/UL — SIGNIFICANT CHANGE UP (ref 3.8–10.5)
WBC UR QL: 7 /HPF — HIGH (ref 0–5)

## 2021-04-01 LAB
A1C WITH ESTIMATED AVERAGE GLUCOSE RESULT: 11.3 % — HIGH (ref 4–5.6)
ESTIMATED AVERAGE GLUCOSE: 278 MG/DL — HIGH (ref 68–114)

## 2021-04-01 NOTE — PLAN
[FreeTextEntry1] : \par 60 F with hx of T2DM, obesity, HLD, presenting to clinic for CPE with complaints of vaginal itching\par \par # Vaginal pruritis\par - Itching with dysuria/urgency that resolved. Reported skin erythema but none on exam. No vaginal discharge or lesions. Ddx: candidasis vs bacterial vaginosis. ?UTI ? atrophic vaginitis\par - Bacterial vaginosis swab collected \par - UA with microscopy and urine cx ordered\par - Gyn referral given\par - Advised to follow up if symptoms worsen\par \par # T2DM \par - POCT A1C >11%, patient with no complaints of neuropathy or vision changes \par - C/w metformin 1000mg BID for now, but will likely need additional medications pending lab results including serum A1c and renal function\par - Check microalbumin/Cr \par - yearly optho and podiatry screening advised\par - ADA diet counseled\par \par # HCM\par - Check routine labs: CBC, CMP, lipid panel, TSH, Vit D, Hep C\par - Gyn referral for pap test and for vaginal pruritis\par - Mammogram referral\par - FIT kit given\par - plans to get shingrix vaccine at pharmacy\par - RTC in 5 weeks \par \par Discussed with Dr. Richmond\par \par Dominik Soares, PGY-1

## 2021-04-01 NOTE — PHYSICAL EXAM
[External Female Genitalia] : normal external genitalia [No CVA Tenderness] : no CVA  tenderness [Normal] : normal gait, coordination grossly intact, no focal deficits and deep tendon reflexes were 2+ and symmetric [Normal Affect] : the affect was normal [Normal Mood] : the mood was normal [Normal Insight/Judgement] : insight and judgment were intact [de-identified] : No vaginal discharge, no vaginal skin lesions

## 2021-04-01 NOTE — HEALTH RISK ASSESSMENT
[1] : 1) Little interest or pleasure doing things for several days (1) [0] : 2) Feeling down, depressed, or hopeless: Not at all (0) [No] : No [] : No [[id number]] : 1

## 2021-04-01 NOTE — HISTORY OF PRESENT ILLNESS
[FreeTextEntry1] : 60 F presenting to clinic for CPE [de-identified] : 60 F with hx of DM2, obesity, HLD, presenting for CPE. Patient reports overall good health. Only complaint this visit was some vaginal pruritis that has been going on for 1 month. Patient reports itching in the vaginal area with some dysuria initially. Symptoms improved and now denies any dysuria, urgency, or hesitancy. Patient also noted some redness in the skin in the vaginal area and had used cream in the past for it, with mild improvement. No vaginal discharge or odors. Patient last saw gynecologist 3 years ago. Rest of ROS negative. Patient reports a decrease in her appetite but no weight loss/gain. No issues with sleep. \par

## 2021-04-02 DIAGNOSIS — E11.65 TYPE 2 DIABETES MELLITUS WITH HYPERGLYCEMIA: ICD-10-CM

## 2021-04-02 DIAGNOSIS — Z00.00 ENCOUNTER FOR GENERAL ADULT MEDICAL EXAMINATION WITHOUT ABNORMAL FINDINGS: ICD-10-CM

## 2021-04-02 DIAGNOSIS — N89.8 OTHER SPECIFIED NONINFLAMMATORY DISORDERS OF VAGINA: ICD-10-CM

## 2021-04-02 LAB
CULTURE RESULTS: SIGNIFICANT CHANGE UP
HBA1C MFR BLD HPLC: 11.3
SPECIMEN SOURCE: SIGNIFICANT CHANGE UP

## 2021-04-20 ENCOUNTER — LABORATORY RESULT (OUTPATIENT)
Age: 61
End: 2021-04-20

## 2021-04-21 ENCOUNTER — OUTPATIENT (OUTPATIENT)
Dept: OUTPATIENT SERVICES | Facility: HOSPITAL | Age: 61
LOS: 1 days | End: 2021-04-21
Payer: MEDICAID

## 2021-04-21 ENCOUNTER — NON-APPOINTMENT (OUTPATIENT)
Age: 61
End: 2021-04-21

## 2021-04-21 ENCOUNTER — APPOINTMENT (OUTPATIENT)
Dept: INTERNAL MEDICINE | Facility: CLINIC | Age: 61
End: 2021-04-21
Payer: MEDICAID

## 2021-04-21 DIAGNOSIS — I10 ESSENTIAL (PRIMARY) HYPERTENSION: ICD-10-CM

## 2021-04-21 PROCEDURE — G0463: CPT

## 2021-04-21 PROCEDURE — 99213 OFFICE O/P EST LOW 20 MIN: CPT | Mod: GE

## 2021-04-21 RX ORDER — INSULIN GLARGINE 100 [IU]/ML
100 INJECTION, SOLUTION SUBCUTANEOUS AT BEDTIME
Qty: 1 | Refills: 0 | Status: DISCONTINUED | COMMUNITY
Start: 2021-04-19 | End: 2021-04-21

## 2021-04-21 NOTE — PHYSICAL EXAM
[No Lymphadenopathy] : no lymphadenopathy [No Respiratory Distress] : no respiratory distress  [Clear to Auscultation] : lungs were clear to auscultation bilaterally [Normal] : normal rate, regular rhythm, normal S1 and S2 and no murmur heard [No Edema] : there was no peripheral edema [Soft] : abdomen soft [Non Tender] : non-tender [Normal Bowel Sounds] : normal bowel sounds [Coordination Grossly Intact] : coordination grossly intact [No Focal Deficits] : no focal deficits

## 2021-04-21 NOTE — HISTORY OF PRESENT ILLNESS
[FreeTextEntry1] : follow up diabetes  [de-identified] : The patient is a 60 year old woman with PMH DMT2, obesity and HLD here for follow up of diabetes. States she has been taking metformin as prescribed. Insulin was sent to her pharmacy on 4/19, patient states she was not aware it was sent so she has not yet started taking it. Has not been checking blood sugars at home, says she doesn't have strips or needles to check her blood sugar. \par Eats eggs with fruit for breakfast, fish for lunch with vegetables, and similar food for dinner also sometimes has beans. States she sometimes forgets to take the nighttime dose of metformin, maybe 3/7 days per week. Denies polydipsia/ polyuria/polyphagia. \par \par May 26th has an appointment with gynecology.States vaginal pruritus has improved with the use of metronidazole Still has some intermittent itching. Did fit test, came back negative.\par

## 2021-04-21 NOTE — ASSESSMENT
[FreeTextEntry1] : This is a 60 year old woman with PMH T2DM, obesity and HLD here for follow up of diabetes. \par \par #Diabetes\par -Continue metformin 1000mg BID\par -Start taking Basaglar 13 units daily, already sent to pharmacy\par -At next visit, plan to discuss alternative medications instead of insulin including GLP1/SGLT2. Pharmacy looked into coverage and Trulicity would not require prior authorization so would be a good option.\par -Strips and lancets sent to [facility]'s pharmacy, instructed to check sugars before breakfast and dinner \par -Follow up in 4 weeks\par \par #HCM\par -FIT test negative\par -has gyn appointment scheduled for may 26th\par -Will call to schedule mammogram, referral already placed\par \par \par Return to clinic in 4 weeks for follow up of diabetes

## 2021-04-21 NOTE — REVIEW OF SYSTEMS
[Fever] : no fever [Chills] : no chills [Fatigue] : no fatigue [Vision Problems] : no vision problems [Chest Pain] : no chest pain [Palpitations] : no palpitations [Shortness Of Breath] : no shortness of breath [Cough] : no cough [Headache] : no headache [Dizziness] : no dizziness

## 2021-04-29 DIAGNOSIS — E11.65 TYPE 2 DIABETES MELLITUS WITH HYPERGLYCEMIA: ICD-10-CM

## 2021-04-29 DIAGNOSIS — E78.5 HYPERLIPIDEMIA, UNSPECIFIED: ICD-10-CM

## 2021-05-26 ENCOUNTER — RESULT REVIEW (OUTPATIENT)
Age: 61
End: 2021-05-26

## 2021-05-26 ENCOUNTER — OUTPATIENT (OUTPATIENT)
Dept: OUTPATIENT SERVICES | Facility: HOSPITAL | Age: 61
LOS: 1 days | End: 2021-05-26
Payer: MEDICAID

## 2021-05-26 ENCOUNTER — LABORATORY RESULT (OUTPATIENT)
Age: 61
End: 2021-05-26

## 2021-05-26 ENCOUNTER — APPOINTMENT (OUTPATIENT)
Dept: OBGYN | Facility: CLINIC | Age: 61
End: 2021-05-26
Payer: MEDICAID

## 2021-05-26 VITALS — WEIGHT: 151 LBS | BODY MASS INDEX: 32.68 KG/M2 | SYSTOLIC BLOOD PRESSURE: 120 MMHG | DIASTOLIC BLOOD PRESSURE: 82 MMHG

## 2021-05-26 VITALS — TEMPERATURE: 97.1 F

## 2021-05-26 DIAGNOSIS — N95.1 MENOPAUSAL AND FEMALE CLIMACTERIC STATES: ICD-10-CM

## 2021-05-26 DIAGNOSIS — R14.0 ABDOMINAL DISTENSION (GASEOUS): ICD-10-CM

## 2021-05-26 DIAGNOSIS — B35.9 DERMATOPHYTOSIS, UNSPECIFIED: ICD-10-CM

## 2021-05-26 DIAGNOSIS — Z01.419 ENCOUNTER FOR GYNECOLOGICAL EXAMINATION (GENERAL) (ROUTINE) WITHOUT ABNORMAL FINDINGS: ICD-10-CM

## 2021-05-26 DIAGNOSIS — N76.0 ACUTE VAGINITIS: ICD-10-CM

## 2021-05-26 DIAGNOSIS — Z01.419 ENCOUNTER FOR GYNECOLOGICAL EXAMINATION (GENERAL) (ROUTINE) W/OUT ABNORMAL FINDINGS: ICD-10-CM

## 2021-05-26 PROCEDURE — G0463: CPT

## 2021-05-26 PROCEDURE — 87624 HPV HI-RISK TYP POOLED RSLT: CPT

## 2021-05-26 PROCEDURE — 99203 OFFICE O/P NEW LOW 30 MIN: CPT

## 2021-05-27 LAB — HPV HIGH+LOW RISK DNA PNL CVX: SIGNIFICANT CHANGE UP

## 2021-05-28 NOTE — PHYSICAL EXAM
[Appropriately responsive] : appropriately responsive [Alert] : alert [No Acute Distress] : no acute distress [No Lymphadenopathy] : no lymphadenopathy [Regular Rate Rhythm] : regular rate rhythm [No Murmurs] : no murmurs [Clear to Auscultation B/L] : clear to auscultation bilaterally [Soft] : soft [Non-tender] : non-tender [Non-distended] : non-distended [No HSM] : No HSM [No Lesions] : no lesions [No Mass] : no mass [Oriented x3] : oriented x3 [Examination Of The Breasts] : a normal appearance [No Masses] : no breast masses were palpable [Labia Majora] : normal [Labia Minora] : normal [Atrophy] : atrophy [Normal] : normal [Uterine Adnexae] : normal [FreeTextEntry6] : limited exam 2/2 body habitus

## 2021-05-28 NOTE — HISTORY OF PRESENT ILLNESS
[FreeTextEntry1] : 61 yo  (LMP 50yo) presents for annual exam.  Has not been seen since .  Denies pain/ pmb. +vague bloating.  Sex active- +vag dryness is an issue.  Declined STD screen.  Denies change in bowel or bladder habits.  \par \par - PAP -  neg  Denies abnl paps/ stds\par - Mammo 2015 Birads 1\par - Colonoscopy 2011 - rpt 7-10yrs\par \par \par Gen health followed by Medicine (DM/HLD)\par COVID vaccine- refused\par

## 2021-05-31 ENCOUNTER — RX RENEWAL (OUTPATIENT)
Age: 61
End: 2021-05-31

## 2021-06-10 ENCOUNTER — RESULT REVIEW (OUTPATIENT)
Age: 61
End: 2021-06-10

## 2021-06-10 ENCOUNTER — APPOINTMENT (OUTPATIENT)
Dept: ULTRASOUND IMAGING | Facility: IMAGING CENTER | Age: 61
End: 2021-06-10
Payer: MEDICAID

## 2021-06-10 ENCOUNTER — OUTPATIENT (OUTPATIENT)
Dept: OUTPATIENT SERVICES | Facility: HOSPITAL | Age: 61
LOS: 1 days | End: 2021-06-10
Payer: MEDICAID

## 2021-06-10 ENCOUNTER — APPOINTMENT (OUTPATIENT)
Dept: INTERNAL MEDICINE | Facility: CLINIC | Age: 61
End: 2021-06-10
Payer: MEDICAID

## 2021-06-10 ENCOUNTER — APPOINTMENT (OUTPATIENT)
Dept: MAMMOGRAPHY | Facility: IMAGING CENTER | Age: 61
End: 2021-06-10
Payer: MEDICAID

## 2021-06-10 ENCOUNTER — NON-APPOINTMENT (OUTPATIENT)
Age: 61
End: 2021-06-10

## 2021-06-10 ENCOUNTER — OUTPATIENT (OUTPATIENT)
Dept: OUTPATIENT SERVICES | Facility: HOSPITAL | Age: 61
LOS: 1 days | End: 2021-06-10

## 2021-06-10 VITALS
HEART RATE: 90 BPM | WEIGHT: 148 LBS | HEIGHT: 57 IN | SYSTOLIC BLOOD PRESSURE: 106 MMHG | OXYGEN SATURATION: 97 % | DIASTOLIC BLOOD PRESSURE: 70 MMHG | BODY MASS INDEX: 31.93 KG/M2

## 2021-06-10 DIAGNOSIS — N76.0 ACUTE VAGINITIS: ICD-10-CM

## 2021-06-10 DIAGNOSIS — I10 ESSENTIAL (PRIMARY) HYPERTENSION: ICD-10-CM

## 2021-06-10 DIAGNOSIS — R14.0 ABDOMINAL DISTENSION (GASEOUS): ICD-10-CM

## 2021-06-10 LAB
ALBUMIN: 30
CREATININE: 200
HBA1C MFR BLD HPLC: 9.6
MICROALBUMIN/CREAT UR TEST STR-RTO: <30

## 2021-06-10 PROCEDURE — 76856 US EXAM PELVIC COMPLETE: CPT | Mod: 26

## 2021-06-10 PROCEDURE — 76856 US EXAM PELVIC COMPLETE: CPT

## 2021-06-10 PROCEDURE — 77063 BREAST TOMOSYNTHESIS BI: CPT | Mod: 26

## 2021-06-10 PROCEDURE — 76830 TRANSVAGINAL US NON-OB: CPT

## 2021-06-10 PROCEDURE — G0463: CPT | Mod: 25

## 2021-06-10 PROCEDURE — 99213 OFFICE O/P EST LOW 20 MIN: CPT | Mod: GE

## 2021-06-10 PROCEDURE — 77067 SCR MAMMO BI INCL CAD: CPT | Mod: 26

## 2021-06-10 PROCEDURE — 77063 BREAST TOMOSYNTHESIS BI: CPT

## 2021-06-10 PROCEDURE — 76830 TRANSVAGINAL US NON-OB: CPT | Mod: 26

## 2021-06-10 PROCEDURE — 77067 SCR MAMMO BI INCL CAD: CPT

## 2021-06-10 PROCEDURE — 83036 HEMOGLOBIN GLYCOSYLATED A1C: CPT

## 2021-06-10 RX ORDER — BLOOD-GLUCOSE METER
KIT MISCELLANEOUS TWICE DAILY
Qty: 2 | Refills: 2 | Status: DISCONTINUED | COMMUNITY
Start: 2018-11-07 | End: 2021-06-10

## 2021-06-10 NOTE — REVIEW OF SYSTEMS
[Diarrhea] : diarrhea [Itching] : Itching [Fever] : no fever [Chills] : no chills [Vision Problems] : no vision problems [Chest Pain] : no chest pain [Shortness Of Breath] : no shortness of breath [Dyspnea on Exertion] : no dyspnea on exertion [Abdominal Pain] : no abdominal pain [Nausea] : no nausea [Vomiting] : no vomiting [Frequency] : no frequency [Headache] : no headache [de-identified] : Toes itching

## 2021-06-10 NOTE — COUNSELING
[Potential consequences of obesity discussed] : Potential consequences of obesity discussed [Benefits of weight loss discussed] : Benefits of weight loss discussed [Encouraged to increase physical activity] : Encouraged to increase physical activity [Decrease Portions] : decrease portions [Good understanding] : Patient has a good understanding of disease, goals and obesity follow-up plan [FreeTextEntry3] : Encouraged to run for 25-30 minutes twice weekly, and walk for 25-30 minutes on the other days.

## 2021-06-10 NOTE — PHYSICAL EXAM
[Normal Sclera/Conjunctiva] : normal sclera/conjunctiva [PERRL] : pupils equal round and reactive to light [EOMI] : extraocular movements intact [Normal] : normal rate, regular rhythm, normal S1 and S2 and no murmur heard [Pedal Pulses Present] : the pedal pulses are present [No Edema] : there was no peripheral edema [No Extremity Clubbing/Cyanosis] : no extremity clubbing/cyanosis [Soft] : abdomen soft [Non-distended] : non-distended [No Rash] : no rash [Normal Affect] : the affect was normal [Normal Insight/Judgement] : insight and judgment were intact [Right Foot Was Examined] : Right foot ~C was examined [Left Foot Was Examined] : left foot ~C was examined [None] : no ulcers in either foot were found [] : both feet [de-identified] : Obese [de-identified] : Cracking between toes of R foot. No bleeding, discharge, redness, or signs of infection. [TWNoteComboBox5] : False

## 2021-06-10 NOTE — HISTORY OF PRESENT ILLNESS
[FreeTextEntry1] : DM follow up [de-identified] : 60F PMHx T2DM on insulin, HLD, obesity presents for DM follow up. She reports overall feeling well. She has not been checking her FSGs because she does not have the correct test strips for her OneTouch Ultra 2 glucometer. She is concerned about her blood glucose going too low so she only injects insulin every other day. \par Reports 3 days of nonbloody diarrhea last week, which has resolved.\par \par Needs ophthalmology referral for DM.\par She reports she checks her toes each night, and reports that she gets itching and cracks between her right foot toes, without bleeding. She denies numbness or tingling in her toes.

## 2021-06-10 NOTE — HEALTH RISK ASSESSMENT
[1 or 2 (0 pts)] : 1 or 2 (0 points) [Never (0 pts)] : Never (0 points) [No] : In the past 12 months have you used drugs other than those required for medical reasons? No [No falls in past year] : Patient reported no falls in the past year [] : No [Audit-CScore] : 0

## 2021-06-10 NOTE — ASSESSMENT
[FreeTextEntry1] : 60F with PMHx  T2DM on insulin, HLD, obesity presents for DM follow up.\par \par RTC in 10 weeks for DM follow up\par D/w Dr. Foreman

## 2021-06-14 ENCOUNTER — NON-APPOINTMENT (OUTPATIENT)
Age: 61
End: 2021-06-14

## 2021-06-21 DIAGNOSIS — E66.9 OBESITY, UNSPECIFIED: ICD-10-CM

## 2021-06-21 DIAGNOSIS — E11.65 TYPE 2 DIABETES MELLITUS WITH HYPERGLYCEMIA: ICD-10-CM

## 2021-07-01 ENCOUNTER — LABORATORY RESULT (OUTPATIENT)
Age: 61
End: 2021-07-01

## 2021-07-01 ENCOUNTER — OUTPATIENT (OUTPATIENT)
Dept: OUTPATIENT SERVICES | Facility: HOSPITAL | Age: 61
LOS: 1 days | End: 2021-07-01
Payer: MEDICAID

## 2021-07-01 ENCOUNTER — APPOINTMENT (OUTPATIENT)
Dept: OBGYN | Facility: CLINIC | Age: 61
End: 2021-07-01
Payer: MEDICAID

## 2021-07-01 VITALS — SYSTOLIC BLOOD PRESSURE: 120 MMHG | WEIGHT: 151.5 LBS | BODY MASS INDEX: 32.78 KG/M2 | DIASTOLIC BLOOD PRESSURE: 78 MMHG

## 2021-07-01 DIAGNOSIS — R93.5 ABNORMAL FINDINGS ON DIAGNOSTIC IMAGING OF OTHER ABDOMINAL REGIONS, INCLUDING RETROPERITONEUM: ICD-10-CM

## 2021-07-01 DIAGNOSIS — N84.1 POLYP OF CERVIX UTERI: ICD-10-CM

## 2021-07-01 DIAGNOSIS — N76.0 ACUTE VAGINITIS: ICD-10-CM

## 2021-07-01 PROCEDURE — 58100 BIOPSY OF UTERUS LINING: CPT

## 2021-07-01 PROCEDURE — 58100 BIOPSY OF UTERUS LINING: CPT | Mod: NC

## 2021-07-01 PROCEDURE — 99213 OFFICE O/P EST LOW 20 MIN: CPT | Mod: 25

## 2021-07-01 PROCEDURE — G0463: CPT | Mod: 25

## 2021-07-02 NOTE — PROCEDURE
[Endometrial Biopsy] : Endometrial biopsy [Abnormal US] : abnormal ultrasound findings [Risks] : risks [Benefits] : benefits [Alternatives] : alternatives [Patient] : patient [Infection] : infection [Bleeding] : bleeding [Allergic Reaction] : allergic reaction [Uterine Perforation] : uterine perforation [CONSENT OBTAINED] : written consent was obtained prior to the procedure. [Betadine] : Betadine [None] : none [Required Dilation] : required dilation [Pipelle] : a Pipelle endometrial suction curette [Scant] : a scant [Sent to Histology] : the specimen was place in buffered formalin and sent for pathlogy [Tolerated Well] : the patient tolerated the procedure well [Pain] : pain [de-identified] : postmenopause [de-identified] : motrin 600mg po given at end of procedure

## 2021-07-12 DIAGNOSIS — N84.1 POLYP OF CERVIX UTERI: ICD-10-CM

## 2021-07-12 DIAGNOSIS — R93.5 ABNORMAL FINDINGS ON DIAGNOSTIC IMAGING OF OTHER ABDOMINAL REGIONS, INCLUDING RETROPERITONEUM: ICD-10-CM

## 2021-08-10 ENCOUNTER — APPOINTMENT (OUTPATIENT)
Dept: OPHTHALMOLOGY | Facility: CLINIC | Age: 61
End: 2021-08-10

## 2021-10-12 ENCOUNTER — OUTPATIENT (OUTPATIENT)
Dept: OUTPATIENT SERVICES | Facility: HOSPITAL | Age: 61
LOS: 1 days | End: 2021-10-12
Payer: MEDICAID

## 2021-10-12 ENCOUNTER — APPOINTMENT (OUTPATIENT)
Dept: GASTROENTEROLOGY | Facility: HOSPITAL | Age: 61
End: 2021-10-12
Payer: MEDICAID

## 2021-10-12 VITALS
TEMPERATURE: 97.8 F | WEIGHT: 155 LBS | RESPIRATION RATE: 16 BRPM | BODY MASS INDEX: 33.44 KG/M2 | HEART RATE: 91 BPM | SYSTOLIC BLOOD PRESSURE: 113 MMHG | HEIGHT: 57 IN | DIASTOLIC BLOOD PRESSURE: 78 MMHG

## 2021-10-12 DIAGNOSIS — R19.7 DIARRHEA, UNSPECIFIED: ICD-10-CM

## 2021-10-12 DIAGNOSIS — R10.9 UNSPECIFIED ABDOMINAL PAIN: ICD-10-CM

## 2021-10-12 DIAGNOSIS — Z12.11 ENCOUNTER FOR SCREENING FOR MALIGNANT NEOPLASM OF COLON: ICD-10-CM

## 2021-10-12 DIAGNOSIS — E66.9 OBESITY, UNSPECIFIED: ICD-10-CM

## 2021-10-12 PROCEDURE — G0463: CPT

## 2021-10-12 PROCEDURE — 99204 OFFICE O/P NEW MOD 45 MIN: CPT | Mod: GC

## 2021-10-12 NOTE — END OF VISIT
[] : Fellow [FreeTextEntry3] : Agree with above. Has mild reflux intermittently, no alarm symptoms. Will recommend lifestyle modifications and H2 blocker PRN. Will check stools studies for workup of diarrhea - plan for colonoscopy for workup of the diarrhea and as patient is due for colon cancer screening. [Time Spent: ___ minutes] : I have spent [unfilled] minutes of time on the encounter.

## 2021-10-12 NOTE — HISTORY OF PRESENT ILLNESS
[Wt Loss ___ Lbs] : no recent weight loss [de-identified] : 60F PMHx IDDM (A1c 9.6% 06/2021), HLD, obesity, choledocholithiasis s/p ERCP 2013 and cholecystecomy in 2013, who presents as a new pt to the GI clinic for a screening colonoscopy. \par \par Pt last had c-scope 10/2011 that showed diverticulosis in the proximal sigmoid colon, sigmoid angioectasia, no polyps. Recommended repeat c-scope in 7-10 years. \par \par Has intermittent diarrhea in the mornings after drinking coffee; started 1 month ago, no bloody stool; no recent abx use; worse after consuming dairy. Reports few months of heart burn 2x/week after eating, worse after eating garlic, tomatoes, acidic foods; not on any H2 blockers or PPI; takes TUMS on occasion which helps. No fever/chills, abdominal pain, N/V/C, melena/hematochezia, dysphagia/odynophagia. Has BM 3-4x/day with variable consistency. Has intentional weight loss 15 lbs in the last year. No night sweats. Sugars are better controlled than before, in the morning fasting glucose usually 120's.\par \par Denies ETOH or tobacco use, no marijuana use. \par Family hx- sister with stomach cancer; no family history of colon yenni\par \par Has been vaccinated for COVID.

## 2021-10-12 NOTE — ASSESSMENT
[FreeTextEntry1] : 60F PMHx IDDM (A1c 9.6% 06/2021), HLD, obesity, choledocholithiasis s/p ERCP 2013 and cholecystecomy in 2013, who presents as a new pt to the GI clinic for a screening colonoscopy. \par \par Pt last had c-scope 10/2011 that showed diveticulosis in the proximal sigmoid colon, sigmoid angioectasia, no polyps. Recommended repeat c-scope in 7-10 years. \par \par Has intermittent diarrhea in the mornings after drinking coffee; started 1 month ago, no bloody stool; no recent abx use; worse after consuming dairy. Reports few months of heart burn 2x/week after eating, worse after eating garlic, tomatoes, acidic foods; not on any H2 blockers or PPI; takes TUMS on occasion which helps. No fever/chills, abdominal pain, N/V/C, melena/hematochezia, dysphagia/odynophagia. Has BM 3-4x/day with variable consistency. Has intentional weight loss 15 lbs in the last year. No night sweats.\par \par Impression:\par #subacute intermittent diarrhea- differential includes infectious vs inflammatory etiology vs lactose intolerance or malabsorptive condition; lower c/f malignancy as pt does not have alarm sx; would benefit from diagnostic colonoscopy and stool studies as mentioned below\par #screening c-scope- last c-scope 10/2011; no polyps; due 10/2021\par #heartburn- chronic intermittent; worse with acidic foods\par \par \par Plan:\par - GI PCR, C diff PCR\par - diagnostic + screening c-scope; ordered Clenpiq prep; risks/benefits explained\par - trial off milk and dairy products\par - discussed weight loss and continued lifestyle modifications (avoiding spicy/acidic foods, eating late; avoiding tight clothing)\par - ordered pepcid 20 mg BID PRN for heartburn\par - Avoid late night meals, elevate head of bed\par \par #[phone] (pt's cell phone)\par \par Pt seen and d/w Dr. Valle.\par \par Ynes Daigle MD\par NS/[facility] GI Fellow, PGY-4\par

## 2021-10-12 NOTE — PHYSICAL EXAM
[General Appearance - Alert] : alert [General Appearance - In No Acute Distress] : in no acute distress [Extraocular Movements] : extraocular movements were intact [Hearing Threshold Finger Rub Not [place]] : hearing was normal [] : no respiratory distress [Heart Rate And Rhythm] : heart rate was normal and rhythm regular [Heart Sounds] : normal S1 and S2 [Bowel Sounds] : normal bowel sounds [Abdomen Soft] : soft [Abdomen Tenderness] : non-tender [Skin Color & Pigmentation] : normal skin color and pigmentation [Skin Turgor] : normal skin turgor [Cranial Nerves] : cranial nerves 2-12 were intact [Oriented To Time, Place, And Person] : oriented to person, place, and time [Impaired Insight] : insight and judgment were intact [Affect] : the affect was normal [Sclera] : the sclera and conjunctiva were normal [Outer Ear] : the ears and nose were normal in appearance [Neck Appearance] : the appearance of the neck was normal [Involuntary Movements] : no involuntary movements were seen

## 2021-10-12 NOTE — REVIEW OF SYSTEMS
[As Noted in HPI] : as noted in HPI [Fever] : no fever [Chills] : no chills [Eyesight Problems] : no eyesight problems [Loss Of Hearing] : no hearing loss [Negative] : Heme/Lymph

## 2021-10-13 DIAGNOSIS — Z12.11 ENCOUNTER FOR SCREENING FOR MALIGNANT NEOPLASM OF COLON: ICD-10-CM

## 2021-10-13 DIAGNOSIS — E66.9 OBESITY, UNSPECIFIED: ICD-10-CM

## 2021-10-13 DIAGNOSIS — R19.7 DIARRHEA, UNSPECIFIED: ICD-10-CM

## 2021-10-13 DIAGNOSIS — R12 HEARTBURN: ICD-10-CM

## 2021-10-18 ENCOUNTER — OUTPATIENT (OUTPATIENT)
Dept: OUTPATIENT SERVICES | Facility: HOSPITAL | Age: 61
LOS: 1 days | End: 2021-10-18
Payer: MEDICAID

## 2021-10-18 DIAGNOSIS — Z12.11 ENCOUNTER FOR SCREENING FOR MALIGNANT NEOPLASM OF COLON: ICD-10-CM

## 2021-10-18 DIAGNOSIS — E66.9 OBESITY, UNSPECIFIED: ICD-10-CM

## 2021-10-18 DIAGNOSIS — R12 HEARTBURN: ICD-10-CM

## 2021-10-18 DIAGNOSIS — R19.7 DIARRHEA, UNSPECIFIED: ICD-10-CM

## 2021-10-18 PROCEDURE — 87324 CLOSTRIDIUM AG IA: CPT

## 2021-10-18 PROCEDURE — 87507 IADNA-DNA/RNA PROBE TQ 12-25: CPT

## 2021-11-02 ENCOUNTER — NON-APPOINTMENT (OUTPATIENT)
Age: 61
End: 2021-11-02

## 2021-11-03 ENCOUNTER — OUTPATIENT (OUTPATIENT)
Dept: OUTPATIENT SERVICES | Facility: HOSPITAL | Age: 61
LOS: 1 days | End: 2021-11-03
Payer: MEDICAID

## 2021-11-03 ENCOUNTER — RESULT CHARGE (OUTPATIENT)
Age: 61
End: 2021-11-03

## 2021-11-03 ENCOUNTER — APPOINTMENT (OUTPATIENT)
Dept: INTERNAL MEDICINE | Facility: CLINIC | Age: 61
End: 2021-11-03
Payer: MEDICAID

## 2021-11-03 VITALS
OXYGEN SATURATION: 98 % | DIASTOLIC BLOOD PRESSURE: 70 MMHG | HEIGHT: 57 IN | BODY MASS INDEX: 31.5 KG/M2 | SYSTOLIC BLOOD PRESSURE: 100 MMHG | WEIGHT: 146 LBS | HEART RATE: 78 BPM

## 2021-11-03 DIAGNOSIS — I10 ESSENTIAL (PRIMARY) HYPERTENSION: ICD-10-CM

## 2021-11-03 DIAGNOSIS — L85.3 XEROSIS CUTIS: ICD-10-CM

## 2021-11-03 PROCEDURE — 83036 HEMOGLOBIN GLYCOSYLATED A1C: CPT

## 2021-11-03 PROCEDURE — 99213 OFFICE O/P EST LOW 20 MIN: CPT | Mod: GE

## 2021-11-03 PROCEDURE — G0463: CPT | Mod: 25

## 2021-11-03 PROCEDURE — T1013: CPT

## 2021-11-03 RX ORDER — PETROLATUM,WHITE 41 %
OINTMENT (GRAM) TOPICAL 3 TIMES DAILY
Qty: 1 | Refills: 0 | Status: ACTIVE | COMMUNITY
Start: 2021-11-03 | End: 1900-01-01

## 2021-11-03 RX ORDER — ELECTROLYTES/DEXTROSE
31G X 5 MM SOLUTION, ORAL ORAL
Qty: 2 | Refills: 1 | Status: COMPLETED | COMMUNITY
Start: 2018-11-07 | End: 2021-11-03

## 2021-11-03 RX ORDER — LANCETS 33 GAUGE
EACH MISCELLANEOUS
Qty: 2 | Refills: 3 | Status: COMPLETED | COMMUNITY
Start: 2018-11-07 | End: 2021-11-03

## 2021-11-03 RX ORDER — SEMAGLUTIDE 1.34 MG/ML
2 INJECTION, SOLUTION SUBCUTANEOUS
Qty: 1 | Refills: 3 | Status: COMPLETED | COMMUNITY
Start: 2021-11-03 | End: 2021-11-03

## 2021-11-15 NOTE — ASSESSMENT
[FreeTextEntry1] : 60 year old female PMH DM2, HLD, obesity, cholecystectomy presenting for a follow up for diabetes management \par \par #DM2\par - patient's A1C is 7.1% today and her finger sticks have been within range\par - will d/c insulin and continue with metformin \par - encouraged healthy diet and exercise \par \par #dry skin \par - will prescribe patient Eucerin for dry skin and continue to monitor \par \par Follow up in 5 weeks for further diabetes management

## 2021-11-15 NOTE — PHYSICAL EXAM
[Normal] : soft, non-tender, non-distended, no masses palpated, no HSM and normal bowel sounds [Comprehensive Foot Exam Normal] : Right and left foot were examined and both feet are normal. No ulcers in either foot. Toes are normal and with full ROM.  Normal tactile sensation with monofilament testing throughout both feet [de-identified] : right foot with dry skin

## 2021-11-15 NOTE — HISTORY OF PRESENT ILLNESS
[[employer] ] : provided by [employer]   [Time Spent: ____ minutes] : Total time spent using  services: [unfilled] minutes. The patient's primary language is not English thus required  services. [FreeTextEntry1] : Follow up for diabetes management  [Interpreters_IDNumber] : [id number] [Interpreters_FullName] : Murphy  [TWNoteComboBox1] : [place] [de-identified] : 60 year old female PMH DM2 (A1C 9.6% in June 2021), HLD, obesity, cholecystectomy presenting for a follow up. \par \par Patient states that since her last appointment she has been checking her blood sugar levels in the morning and two hours after her largest meal. Reports that her blood glucose levels have been 140s-160s both in the morning and afternoon. She takes metformin daily but only takes insulin 2-3 times per week because she is trying to wean herself off of insulin. She has been eating a healthier diet and losing weight. Denies polyuria and polydipsia. Patient also reports the skin under her right foot is dry but that she has intact sensation. \par \par

## 2021-11-15 NOTE — REVIEW OF SYSTEMS
[Fever] : no fever [Chills] : no chills [Fatigue] : no fatigue [Chest Pain] : no chest pain [Shortness Of Breath] : no shortness of breath [Abdominal Pain] : no abdominal pain [Nausea] : no nausea [Constipation] : no constipation [Diarrhea] : diarrhea [Vomiting] : no vomiting [Nocturia] : no nocturia

## 2021-11-16 DIAGNOSIS — L85.3 XEROSIS CUTIS: ICD-10-CM

## 2021-11-16 DIAGNOSIS — E11.65 TYPE 2 DIABETES MELLITUS WITH HYPERGLYCEMIA: ICD-10-CM

## 2021-11-22 ENCOUNTER — OUTPATIENT (OUTPATIENT)
Dept: OUTPATIENT SERVICES | Facility: HOSPITAL | Age: 61
LOS: 1 days | End: 2021-11-22
Payer: MEDICAID

## 2021-11-22 DIAGNOSIS — Z11.52 ENCOUNTER FOR SCREENING FOR COVID-19: ICD-10-CM

## 2021-11-22 LAB — SARS-COV-2 RNA SPEC QL NAA+PROBE: SIGNIFICANT CHANGE UP

## 2021-11-22 PROCEDURE — U0005: CPT

## 2021-11-22 PROCEDURE — C9803: CPT

## 2021-11-22 PROCEDURE — U0003: CPT

## 2021-11-24 ENCOUNTER — RESULT REVIEW (OUTPATIENT)
Age: 61
End: 2021-11-24

## 2021-11-24 ENCOUNTER — OUTPATIENT (OUTPATIENT)
Dept: OUTPATIENT SERVICES | Facility: HOSPITAL | Age: 61
LOS: 1 days | End: 2021-11-24
Payer: MEDICAID

## 2021-11-24 VITALS
RESPIRATION RATE: 15 BRPM | DIASTOLIC BLOOD PRESSURE: 70 MMHG | TEMPERATURE: 97 F | WEIGHT: 154.98 LBS | HEART RATE: 80 BPM | OXYGEN SATURATION: 96 % | HEIGHT: 60 IN | SYSTOLIC BLOOD PRESSURE: 116 MMHG

## 2021-11-24 VITALS
HEART RATE: 82 BPM | RESPIRATION RATE: 14 BRPM | OXYGEN SATURATION: 98 % | DIASTOLIC BLOOD PRESSURE: 69 MMHG | SYSTOLIC BLOOD PRESSURE: 114 MMHG

## 2021-11-24 DIAGNOSIS — R19.7 DIARRHEA, UNSPECIFIED: ICD-10-CM

## 2021-11-24 DIAGNOSIS — Z12.11 ENCOUNTER FOR SCREENING FOR MALIGNANT NEOPLASM OF COLON: ICD-10-CM

## 2021-11-24 DIAGNOSIS — Z90.49 ACQUIRED ABSENCE OF OTHER SPECIFIED PARTS OF DIGESTIVE TRACT: Chronic | ICD-10-CM

## 2021-11-24 LAB — GLUCOSE BLDC GLUCOMTR-MCNC: 118 MG/DL — HIGH (ref 70–99)

## 2021-11-24 PROCEDURE — 45380 COLONOSCOPY AND BIOPSY: CPT | Mod: XS

## 2021-11-24 PROCEDURE — 45380 COLONOSCOPY AND BIOPSY: CPT | Mod: 59,GC

## 2021-11-24 PROCEDURE — 88305 TISSUE EXAM BY PATHOLOGIST: CPT

## 2021-11-24 PROCEDURE — 82962 GLUCOSE BLOOD TEST: CPT

## 2021-11-24 PROCEDURE — 88305 TISSUE EXAM BY PATHOLOGIST: CPT | Mod: 26

## 2021-11-24 PROCEDURE — 45385 COLONOSCOPY W/LESION REMOVAL: CPT

## 2021-11-24 PROCEDURE — 45385 COLONOSCOPY W/LESION REMOVAL: CPT | Mod: GC

## 2021-11-24 RX ORDER — ATORVASTATIN CALCIUM 80 MG/1
1 TABLET, FILM COATED ORAL
Qty: 0 | Refills: 0 | DISCHARGE

## 2021-11-24 NOTE — PRE PROCEDURE NOTE - PRE PROCEDURE EVALUATION
Pre-Endoscopy Evaluation    Attending Physician: Jasmeet    Procedure: Colonoscopy     Indication for Procedure: Screening    Pertinent History: See [employer] chart    PAST MEDICAL & SURGICAL HISTORY:      Allergies    No Known Allergies    Intolerances        Medications: MEDICATIONS  (STANDING):    MEDICATIONS  (PRN):      Pertinent lab data:                      Physical Examination:  Daily     Daily   Vital Signs Last 24 Hrs  T(C): --  T(F): --  HR: --  BP: --  BP(mean): --  RR: --  SpO2: --  Constitutional: NAD  HEENT: PERRLA, EOMI,    Neck:  No JVD  Respiratory: CTAB/L  Cardiovascular: S1 and S2  Gastrointestinal: BS+, soft, NT/ND  Extremities: No peripheral edema  Neurological: A/O x 3, no focal deficits  Psychiatric: Normal mood, normal affect  : No Chaves  Skin: No rashes    Comments:    ASA Class: I []  II []  III []  IV []    The patient is a suitable candidate for the planned procedure unless box checked [ ]  No, explain:

## 2021-11-24 NOTE — ASU PREOP CHECKLIST - DNR CLARIFICATION FORM COMPLETED
PULMONARY CRITICAL CARE  PROGRESS NOTE        Patient: Tana Tucker Date: 10/20/2021   : 6/10/1927 Attending: Lexi Galloway MD   ? ?   Admission date: 10/13/2021      24hr Events: She is slightly more awake today.  Having trouble coughing up any sputum.  Family at bedside      Objective:     Vital Last Value 24 Hour Range   Temperature 98.1 °F (36.7 °C) (10/20/21 0951) Temp  Min: 97.3 °F (36.3 °C)  Max: 98.1 °F (36.7 °C)   Pulse (!) 110 (10/20/21 0951) Pulse  Min: 89  Max: 117   Respiratory 16 (10/20/21 1631) Resp  Min: 16  Max: 18   Non-Invasive  Blood Pressure 139/90 (10/20/21 0951) BP  Min: 139/90  Max: 150/87   Pulse Oximetry 93 % (10/20/21 1600) SpO2  Min: 92 %  Max: 95 %   Arterial   Blood Pressure   No data recorded          I/O last 3 completed shifts:  In: 75 [I.V.:75]  Out: 350 [Urine:350]  No intake/output data recorded.    Physical Exam:  Physical Exam  Vitals reviewed.   Constitutional:       Comments: Elderly female in no acute distress   HENT:      Head: Normocephalic and atraumatic.   Eyes:      Extraocular Movements: Extraocular movements intact.      Conjunctiva/sclera: Conjunctivae normal.      Pupils: Pupils are equal, round, and reactive to light.   Cardiovascular:      Rate and Rhythm: Regular rhythm. Tachycardia present.      Heart sounds: Normal heart sounds.   Pulmonary:      Comments: Inspiratory and expiratory wheezes and rhonchi in right upper lobe.  Audible rhonchi  Abdominal:      General: Abdomen is flat. Bowel sounds are normal.      Palpations: Abdomen is soft.   Musculoskeletal:      Comments: No ankle edema   Neurological:      General: No focal deficit present.      Mental Status: She is alert.            LABS:  Recent Results (from the past 24 hour(s))   Comprehensive Metabolic Panel    Collection Time: 10/20/21  5:46 AM   Result Value Ref Range    Fasting Status      Sodium 135 135 - 145 mmol/L    Potassium 3.5 3.4 - 5.1 mmol/L    Chloride 95 (L) 98 - 107 mmol/L     Carbon Dioxide 33 (H) 21 - 32 mmol/L    Anion Gap 11 10 - 20 mmol/L    Glucose 124 (H) 70 - 99 mg/dL    BUN 80 (H) 6 - 20 mg/dL    Creatinine 1.24 (H) 0.51 - 0.95 mg/dL    Glomerular Filtration Rate 37 (L) >=60    BUN/ Creatinine Ratio 65 (H) 7 - 25    Calcium 9.6 8.4 - 10.2 mg/dL    Bilirubin, Total 0.6 0.2 - 1.0 mg/dL    GOT/AST 33 <=37 Units/L    GPT/ALT 51 <64 Units/L    Alkaline Phosphatase 86 45 - 117 Units/L    Albumin 2.5 (L) 3.6 - 5.1 g/dL    Protein, Total 8.8 (H) 6.4 - 8.2 g/dL    Globulin 6.3 (H) 2.0 - 4.0 g/dL    A/G Ratio 0.4 (L) 1.0 - 2.4   Magnesium    Collection Time: 10/20/21  5:46 AM   Result Value Ref Range    Magnesium 2.4 1.7 - 2.4 mg/dL   Phosphorus    Collection Time: 10/20/21  5:46 AM   Result Value Ref Range    Phosphorus 3.3 2.4 - 4.7 mg/dL   Prothrombin Time    Collection Time: 10/20/21  5:46 AM   Result Value Ref Range    Prothrombin Time 43.1 (H) 9.7 - 11.8 sec    INR 4.2     CBC with Automated Differential (performable only)    Collection Time: 10/20/21  5:46 AM   Result Value Ref Range    WBC 10.5 4.2 - 11.0 K/mcL    RBC 5.01 4.00 - 5.20 mil/mcL    HGB 15.6 (H) 12.0 - 15.5 g/dL    HCT 46.2 36.0 - 46.5 %    MCV 92.2 78.0 - 100.0 fl    MCH 31.1 26.0 - 34.0 pg    MCHC 33.8 32.0 - 36.5 g/dL    RDW-CV 12.8 11.0 - 15.0 %    RDW-SD 43.3 39.0 - 50.0 fL     140 - 450 K/mcL    NRBC 0 <=0 /100 WBC    Neutrophil, Percent 81 %    Lymphocytes, Percent 9 %    Mono, Percent 6 %    Eosinophils, Percent 0 %    Basophils, Percent 1 %    Immature Granulocytes 3 %    Absolute Neutrophils 8.5 (H) 1.8 - 7.7 K/mcL    Absolute Lymphocytes 1.0 1.0 - 4.0 K/mcL    Absolute Monocytes 0.6 0.3 - 0.9 K/mcL    Absolute Eosinophils  0.0 0.0 - 0.5 K/mcL    Absolute Basophils 0.1 0.0 - 0.3 K/mcL    Absolute Immmature Granulocytes 0.4 (H) 0.0 - 0.2 K/mcL       IMAGING:  ESILLAGE MYOCARDIAL PERFUSION SPECT MULTI    Result Date: 10/4/2021  Narrative: EXAM: Lexiscan Myocardial Perfusion Study CLINICAL INDICATION:  89-year-old female with chest pain. Isotope: 4.3 and 12.4 mCi of Tc-99m Sestamibi  for rest and stress images. PROCEDURE: Patient underwent myocardial stress testing according to the Lexiscan protocol. After Lexiscan IV  infusion, 12.4 mCi of technetium 99m sestamibi was injected. FINDINGS: The rest cardiac SPECT images were compared with the post stress cardiac SPECT images.  The rest and post stress images show uniform uptake of the radiotracer without any evidence for ischemia.  The gated SPECT images show normal wall motion with an ejection fraction of 70%. Quantitative analysis using the EC Tool box demonstrates no TID.   The ECG portion of this test will be dictated by cardiologist.     Impression: Normal myocardial perfusion study. This study was reviewed and interpreted with Dr. Mahajan. Delmar Zhao DO Cardiology Fellow Dictated by: DELMAR ZHAO DO Dictated on: 10/4/2021 1:30 PM IKAITLYN M.D., have reviewed the images and report and concur with these findings interpreted by DELMAR ZHAO DO. Electronically Signed by: KAITLYN MAHAJAN M.D. Signed on: 10/4/2021 3:50 PM     XR CHEST PA OR AP 1 VIEW    Result Date: 10/13/2021  Narrative: Exam: Portable chest x-ray. CLINICAL HISTORY: Dyspnea. TECHNIQUE: A single portable frontal upright view of the chest was performed. COMPARISON: Chest x-ray from 10/02/2021. FINDINGS: Cardiomegaly is stable.  There are findings concerning for mild pulmonary vascular congestion, new from the prior.  No pleural effusion or pneumothorax is evident.     Impression: Stable cardiomegaly. Findings concerning for mild pulmonary vascular congestion. Electronically Signed by: KAITLYN VOGT M.D. Signed on: 10/13/2021 7:02 AM     XR CHEST PA OR AP 1 VIEW    Result Date: 10/2/2021  Narrative: EXAM: XR CHEST PA OR AP 1 VIEW CLINICAL INDICATION: Heart failure COMPARISON: None. FINDINGS: Cardiac silhouette is enlarged.  Pulmonary vasculature is unremarkable.  No  pneumothorax, consolidation, pleural effusion.  Trachea is central.  No acute osseous abnormality.     Impression: Enlarged cardiac silhouette Electronically Signed by: TC DURANT M.D. Signed on: 10/2/2021 10:44 PM     TRANSTHORACIC ECHO (TTE) COMPLETE W/ W/O IMAGING AGENT    Impression: *[facility]* [address] [place] [address] Transthoracic Echocardiogram (TTE) Patient: Tana Tucker    Study Date/Time:    Oct 3 2021 8:55AM MRN:     [id number]               FIN#:               [id number] :     06/10/1927            Ht/Wt:              152cm 65.8kg Age:     89                    BSA/BMI:            1.63m^2 28.5kg/m^2 Gender:  F                     Baseline BP:        152 / 80 Ordering Physician:   Sarthak Horton  Referring Physician:  Sarthak Horton  Attending Physician:  Beto Carlson Performing Physician: Arron Reyes MD Diagnostic Physician: Arron Reyes MD Sonographer:          Erin Vu  -------------------------------------------------------------------------- INDICATIONS:   Heart Failure. -------------------------------------------------------------------------- STUDY CONCLUSIONS SUMMARY: 1.  Study data: There is no prior study available for comparison at this     time. 2.  Left ventricle: The cavity size is normal. Wall thickness is normal.     The ejection fraction was measured by biplane method of disks. Doppler     parameters are consistent with abnormal left ventricular relaxation     (grade 1 diastolic dysfunction). The ejection fraction is 50%. 3.  Regional wall motion abnormalities: Akinesis of the mid anteroseptal     myocardium; severe hypokinesis of the apical septal myocardium. 4.  Aortic valve: The annulus is mildly calcified. The valve is     trileaflet. The leaflets are normal thickness. Mild regurgitation. 5.  Mitral valve: The annulus is mildly calcified. The leaflets are normal     thickness. Mild regurgitation. 6.  Left  atrium: The atrium is severely dilated. 7.  Right ventricle: Systolic pressure is markedly increased. The     estimated peak pressure is > 70mm Hg. 8.  Right atrium: The atrium is severely dilated. 9.  Tricuspid valve: Moderate regurgitation. 10. Pulmonic valve: Moderate regurgitation. 11. Inferior vena cava: The vessel is dilated. The respirophasic diameter     changes are blunted (less than 50%). -------------------------------------------------------------------------- STUDY DATA:  There is no prior study available for comparison at this time.  Procedure:  Transthoracic echocardiography was performed. Image quality was good. Intravenous contrast (Definity) was administered to opacify the left ventricle.  M-mode, complete 2D, complete spectral Doppler, and color Doppler.  Study status:  Routine.  Study completion: There were no complications. FINDINGS LEFT VENTRICLE:  The cavity size is normal. Wall thickness is normal. Systolic function is at the lower limits of normal.    The ejection fraction was measured by biplane method of disks. The ejection fraction is 50%.  Regional wall motion abnormalities:  Akinesis of the mid anteroseptal myocardium; severe hypokinesis of the apical septal myocardium. Isovolumic relaxation time is normal. Doppler parameters are consistent with abnormal left ventricular relaxation (grade 1 diastolic dysfunction). AORTIC VALVE:  The annulus is mildly calcified. The valve is trileaflet. The leaflets are normal thickness. Cusp separation is normal.  Doppler: Transvalvular velocity is within the normal range. There is no stenosis. Mild regurgitation.    The LVOT to aortic valve VTI ratio is 0.64. The valve area by the velocity-time integral method is 1.6cm^2. The valve area index by the velocity-time integral method is 1cm^2/m^2. The ratio of LVOT to aortic valve peak velocity is 0.62. The valve area by the peak velocity method is 1.6cm^2. The valve area index by the peak velocity method  is 0.98cm^2/m^2.    The mean systolic gradient is 4mm Hg. The peak systolic gradient is 7mm Hg. AORTA:  Aortic root: The aortic root is normal in size. MITRAL VALVE:  The annulus is mildly calcified. The leaflets are normal thickness. Leaflet separation is normal.  Doppler:  Transvalvular velocity is within the normal range. There is no evidence for stenosis.  Mild regurgitation.    The valve area by pressure half-time is 4.0cm^2. The valve area index by pressure half-time is 2.45cm^2/m^2.    The peak diastolic gradient is 2mm Hg. LEFT ATRIUM:  The atrium is severely dilated. RIGHT VENTRICLE:  The cavity size is normal. Wall thickness is normal. Systolic function is normal. Systolic pressure is markedly increased. The estimated peak pressure is > 70mm Hg. PULMONIC VALVE:    Structurally normal valve.   Cusp separation is normal.  Doppler:  Transvalvular velocity is within the normal range.  Moderate regurgitation. The peak systolic gradient is 34mm Hg. TRICUSPID VALVE:   Structurally normal valve.   Leaflet separation is normal.  Doppler:  Transvalvular velocity is within the normal range. Moderate regurgitation. PULMONARY ARTERY:   The main pulmonary artery is normal-sized. Systolic pressure is increased. RIGHT ATRIUM:  The atrium is severely dilated. PERICARDIUM:  There is no pericardial effusion. SYSTEMIC VEINS: Inferior vena cava: The vessel is dilated. The respirophasic diameter changes are blunted (less than 50%). -------------------------------------------------------------------------- Measurements  Left ventricle             Value        Ref        Aortic valve                Value          Ref  JOCELYN major ax, A4C          5.5   cm     ---------  Leaflet sep, MM             1.5   cm       -----  ESD major ax, A4C          4.4   cm     ---------  Peak v, S                   1.4   m/sec    -----  FS major axis, A4C         20    %      ---------  Mean v, S                   0.9   m/sec    -----  JOCELYN/bsa major  ax, A4C      3.4   cm/m^2 ---------  Mean grad, S                4     mm Hg    -----  ESD/bsa major ax, A4C      2.7   cm/m^2 ---------  Peak grad, S                7     mm Hg    -----  BLAYNE, A4C                   16.6  cm^2   ---------  LVOT/AV, VTI ratio          0.64           -----  HERLINDA, A4C                   10.3  cm^2   ---------  VENU, VTI                    1.6   cm^2     -----  FAC, A4C                   38    %      ---------  VENU/bsa, VTI                1     cm^2/m^2 -----  EF                    (L)  50    %      54 - 74    LVOT/AV, Vpeak ratio        0.62           -----  SV                         42    ml     ---------  VENU, Vmax                   1.6   cm^2     -----  SV/bsa                     26    ml/m^2 ---------  VENU/bsa, Vmax               0.98  cm^2/m^2 -----  SV, 1-p A4C                21    ml     ---------  AR ED v                     2.97  m/s      -----  SV/bsa, 1-p A4C            13    ml/m^2 ---------  AR decel                    169   cm/s^2   -----  EDV, 2-p                   52    ml     46 - 106   AR PHT                      516   ms       -----  ESV, 2-p                   21    ml     14 - 42    AR ED grad                  35    mm Hg    -----  SV, 2-p                    32    ml     ---------  EDV/bsa, 2-p               32    ml/m^2 29 - 61    Mitral valve                Value          Ref  ESV/bsa, 2-p               13    ml/m^2 8 - 24     Peak E                      0.77  m/sec    -----  SV/bsa, 2-p                19.4  ml/m^2 ---------  Peak A                      1.14  m/sec    -----  IVRT                       63    ms     ---------  Decel time                  180   ms       -----  E', lat cooper TDI      (L)  9.79  cm/sec >=10       PHT                         53    ms       -----  E/e', lat cooper TDI         8            ---------  Peak grad, D                2     mm Hg    -----  E', med cooper TDI      (L)  6.95  cm/sec >=7        Peak E/A ratio              0.7             -----  E/e', med cooper, TDI         11           ---------  MVA, PHT                    4.0   cm^2     -----  E', avg, TDI               8.37  cm/sec ---------  MVA/bsa, PHT                2.45  cm^2/m^2 -----  E/e', avg, TDI             9            <=14       MR peak v                   4.22  m/sec    -----                                                     Peak LV-LA grad S           71    mm Hg    -----  LVOT                       Value        Ref        Max MR v                    4.22  m/sec    -----  Diam, S                    1.8   cm     ---------  Regurg VTI                  125.0 cm       -----  Area                       2.5   cm^2   ---------  Peak jaya, S                0.85  m/sec  ---------  Pulmonic valve              Value          Ref  Peak grad, S               3     mm Hg  ---------  Peak v, S                   2.9   m/sec    -----                                                     Peak grad, S                34    mm Hg    -----  Left atrium                Value        Ref        KY v, ED                    1.93  m/sec    -----  SI dim, A4C                3.9   cm     ---------  KY grad, ED                 15    mm Hg    -----  Area ES, A4C          (H)  27    cm^2   <=20  Area ES, A2C               22    cm^2   ---------  Tricuspid valve             Value          Ref  Vol/bsa, S            (H)  53    ml/m^2 16 - 34    TR peak v            (H)    3.9   m/sec    <=2.8  Vol, ES, 1-p A4C      (H)  86    ml     22 - 52    Peak RV-RA grad, S          59    mm Hg    -----  Vol/bsa, ES, 1-p A4C  (H)  53    ml/m^2 11 - 40    Max TR jaya                  3.85  m/sec    -----  Vol, ES, 1-p A2C      (H)  64    ml     22 - 52  Vol/bsa, ES, 1-p A2C       39    ml/m^2 13 - 40    Aortic root                 Value          Ref  Vol, ES, 2-p               79    ml     ---------  Root diam, ED               2.7   cm       <3.9  Vol/bsa, ES, 2-p      (H)  48    ml/m^2 16 - 34  AP dim, ES MM         (H)  3.9   cm      2.7 - 3.8  AP dim index, ES MM   (H)  2.4   cm/m^2 1.5 - 2.3 Legend: (L)  and  (H)  yogi values outside specified reference range. Prepared and electronically signed by Arron Reyes MD 10/03/2021 10:45    Stress test only    Impression: *[facility]* [address] [place] [address] Stress Electrocardiography Regadenoson (Lexiscan) Patient: Tana Tucker    Study Date/Time:    Oct 4 2021 9:33AM MRN:     [id number]               FIN#:               [id number] :     06/10/1927            Ht/Wt:              152cm 66.1kg Age:     89                    BSA/BMI:            1.69m^2 28.6kg/m^2 Gender:  F                     Baseline BP:        134 / 81 Ordering Physician:          Beto Carlson Referring Physician:         Beto Carlson  Attending Physician:         Beto Carlson Diagnostic Physician:        Josh Castaneda MD  Nurse:                       Cara Goins RN -------------------------------------------------------------------------- NUCLEAR IMAGING RESULTS REPORTED SEPARATELY. -------------------------------------------------------------------------- Indications:   Chest pain. -------------------------------------------------------------------------- Study Conclusions Summary:   Stress ECG conclusions: [facility] scoring: exercise time of 4min; maximum ST deviation of 1mm; . Impressions: 1. Baseline ECG: Atrial fibrillation, 75 bpm, diffuse ST T wave changes. Consider ischemia. 2. No symptoms referable to Lexiscan administration. 3. This is a nondiagnostic study given diffuse, baseline ST-T wave changes. 4. Other than atrial fibrillation with a controlled ventricular response no cardiac ectopy was noted. 5. Isotope myocardial perfusion imaging to be reported separately. -------------------------------------------------------------------------- History:   Coronary artery disease.  Risk factors:  Hypertension. Dyslipidemia.  -------------------------------------------------------------------------- Study data:   Study status:  Routine.  Location:  Stress laboratory. Patient status:  Inpatient.  Consent:  The risks, benefits, and alternatives to the procedure were explained to the patient.  Study completion:  The patient tolerated the procedure well. -------------------------------------------------------------------------- Procedure data:  Initial setup. The patient was brought to the laboratory. A baseline ECG was recorded. Intravenous access was obtained. Surface ECG leads and blood pressure measurements were monitored.  Regadenoson (Lexiscan) stress test. Regadenoson (Lexiscan) was administered by intravenous bolus, followed by a 5ml saline flush. The total dose was 0.4mgover 10.00sec. Discharge:  The patient was transferred to the telemetry unitvia cart accompanied by transport personnel. -------------------------------------------------------------------------- Cardiac stress table: +------------------+--+-----------+----------------------------------+ !Stage             !HR!BP         !Comments                          ! +------------------+--+-----------+----------------------------------+ !PREINFSN SUPINE   !77!134/81 (99)!----------------------------------! +------------------+--+-----------+----------------------------------+ !INFUSION DOSE 1   !90!-----------!Isotope injected @ 00:28 seconds..! +------------------+--+-----------+----------------------------------+ !INFUSION DOSE 2   !88!136/68 (91)!----------------------------------! +------------------+--+-----------+----------------------------------+ !INFUSION DOSE 3   !85!132/66 (88)!----------------------------------! +------------------+--+-----------+----------------------------------+ !INFUSION DOSE 4   !87!125/73 (90)!----------------------------------! +------------------+--+-----------+----------------------------------+ !POSTINFSN Recovery!86!-----------!No  symptoms..                     ! +------------------+--+-----------+----------------------------------+ !POSTINFSN Recovery!86!-----------!----------------------------------! +------------------+--+-----------+----------------------------------+ !POSTINFSN Recovery!86!126/71 (89)!----------------------------------! +------------------+--+-----------+----------------------------------+ Stress results:   Maximal heart rate during stress was 103bpm (81% of maximal predicted heart rate). The maximal predicted heart rate was 126bpm.The target heart rate was 107bpm.The target heart rate was not achieved. The rate-pressure product for the peak heart rate and blood pressure was 59584de Hg/min. Stress ECG:  [facility] scoring: exercise time of 4min; maximum ST deviation of 1mm; .                    Prepared and electronically signed by: Josh Castaneda MD 10/04/2021 12:38           ASSESSMENT:     Acute hypoxemic respiratory failure: This is likely a combination of heart failure plus poor airway clearance.  She has chest congestion and has a weak cough.  She is using Acapella several times per day to help her with her clearance.    Acute decompensated heart failure    No definite history of COPD per family    Severe pulmonary hypertension with estimated PA systolic of 70 mmHg found on recent echocardiogram.    PLAN:    O2 to maintain saturation greater than 89%    Encourage cough and deep breathing    Acapella several times per hour    Continue diuresis    Wean off methylprednisolone    Continue nebulized bronchodilator for the time being    Continue 3% saline nebs for now but would discontinue if no clear improvement related to hypertonic saline    Continue Mucinex twice daily    Hospice to meet with patient and family in near future    She is DNR/DNI/DNC/no pressors but okay for    antiarrhythmics    Mya Callejas MD   10/20/2021 5:52 PM             n/a

## 2021-11-24 NOTE — ASU DISCHARGE PLAN (ADULT/PEDIATRIC) - CALL YOUR DOCTOR IF YOU HAVE ANY OF THE FOLLOWING:
Cut the trazodone in half so you are taking 25 mg   We will increase your Lexapro to 20 mg daily       Bleeding that does not stop/Excessive diarrhea/Inability to tolerate liquids or foods

## 2021-11-26 LAB — SURGICAL PATHOLOGY STUDY: SIGNIFICANT CHANGE UP

## 2021-12-01 ENCOUNTER — NON-APPOINTMENT (OUTPATIENT)
Age: 61
End: 2021-12-01

## 2021-12-07 ENCOUNTER — OUTPATIENT (OUTPATIENT)
Dept: OUTPATIENT SERVICES | Facility: HOSPITAL | Age: 61
LOS: 1 days | End: 2021-12-07
Payer: MEDICAID

## 2021-12-07 ENCOUNTER — APPOINTMENT (OUTPATIENT)
Dept: INTERNAL MEDICINE | Facility: CLINIC | Age: 61
End: 2021-12-07
Payer: MEDICAID

## 2021-12-07 ENCOUNTER — NON-APPOINTMENT (OUTPATIENT)
Age: 61
End: 2021-12-07

## 2021-12-07 VITALS
OXYGEN SATURATION: 99 % | BODY MASS INDEX: 33.44 KG/M2 | WEIGHT: 155 LBS | DIASTOLIC BLOOD PRESSURE: 78 MMHG | HEART RATE: 84 BPM | HEIGHT: 57 IN | SYSTOLIC BLOOD PRESSURE: 112 MMHG

## 2021-12-07 DIAGNOSIS — I10 ESSENTIAL (PRIMARY) HYPERTENSION: ICD-10-CM

## 2021-12-07 DIAGNOSIS — Z90.49 ACQUIRED ABSENCE OF OTHER SPECIFIED PARTS OF DIGESTIVE TRACT: Chronic | ICD-10-CM

## 2021-12-07 PROBLEM — E78.00 PURE HYPERCHOLESTEROLEMIA, UNSPECIFIED: Chronic | Status: ACTIVE | Noted: 2021-11-24

## 2021-12-07 PROCEDURE — 99213 OFFICE O/P EST LOW 20 MIN: CPT | Mod: GC

## 2021-12-07 PROCEDURE — T1013: CPT

## 2021-12-07 PROCEDURE — G0463: CPT

## 2021-12-07 RX ORDER — SODIUM PICOSULFATE, MAGNESIUM OXIDE, AND ANHYDROUS CITRIC ACID 10; 3.5; 12 MG/160ML; G/160ML; G/160ML
10-3.5-12 MG-GM LIQUID ORAL
Qty: 2 | Refills: 0 | Status: COMPLETED | COMMUNITY
Start: 2021-10-12 | End: 2021-12-07

## 2021-12-07 NOTE — REVIEW OF SYSTEMS
[Fever] : no fever [Chills] : no chills [Fatigue] : no fatigue [Chest Pain] : no chest pain [Shortness Of Breath] : no shortness of breath [Abdominal Pain] : no abdominal pain [Dysuria] : no dysuria

## 2021-12-07 NOTE — HISTORY OF PRESENT ILLNESS
[[employer] ] : provided by [employer]   [Time Spent: ____ minutes] : Total time spent using  services: [unfilled] minutes. The patient's primary language is not English thus required  services. [FreeTextEntry1] : Follow up  [Interpreters_IDNumber] : [id number] [Interpreters_FullName] : Gretel  [TWNoteComboBox1] : [place] [de-identified] : 60 year old female PMH DM2 (A1C 7.1 11/2021), HLD, cholecystectomy presenting for a follow up for diabetes management. Patient states she has felt well overall since her last visit. States that she takes metformin daily and has been taking insulin intermittently based on her blood sugar readings. After taking 12U of insulin her blood sugar readings are 140-160 but when she does not take insulin her blood sugar readings are 200-225.\par \par Patient also states that over the past few months she has noticed increased hair loss when brushing her hair. States that the hair loss is most prominent near the front of her head and at the vertex of her head. Never had hair loss previously, even after having a child. States her friend had similar hair loss and was prescribed a shampoo which helped.

## 2021-12-07 NOTE — PHYSICAL EXAM
[Normal] : affect was normal and insight and judgment were intact [de-identified] : Hair loss in frontal scalp and on vertex of head

## 2021-12-07 NOTE — ASSESSMENT
[FreeTextEntry1] : 60 year old female PMH DM2 (A1C 7.1 11/2021), HLD, cholecystectomy presenting for a follow up for diabetes management. 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

## 2021-12-09 DIAGNOSIS — E11.65 TYPE 2 DIABETES MELLITUS WITH HYPERGLYCEMIA: ICD-10-CM

## 2021-12-09 DIAGNOSIS — L65.9 NONSCARRING HAIR LOSS, UNSPECIFIED: ICD-10-CM

## 2022-01-11 ENCOUNTER — APPOINTMENT (OUTPATIENT)
Dept: INTERNAL MEDICINE | Facility: CLINIC | Age: 62
End: 2022-01-11

## 2022-01-11 ENCOUNTER — NON-APPOINTMENT (OUTPATIENT)
Age: 62
End: 2022-01-11

## 2022-03-07 NOTE — PLAN
Order in system, can you call pt give her central sched #  [FreeTextEntry1] : 59 y/o F w/ no sig PMHx presents for her CPE. \par \par #DM2, uncontrolled \par - A1c POCT >14\par - start on metformin 500mg BID\par - also started on basal bolus insulin \par - 18u Lantus and 6u Humalog \par - gave nutritionist and diabetic educator referral \par - sent Rxs for glucometer and diabetic supplies \par - Pharmacy student provided insulin administration instructions \par - next visit will need to check microalbumin and add a statin at future visits\par \par #HCM\par - Labs- check CBC, CMP, A1c, Lipids\par - GYN referral \par - mammography rx\par - referrals for optho, and dentist \par - provided diet and nutrition information \par - reports last colonoscopy was <10 years ago, gave FIT test \par \par D/w Dr. Keenan \par RTC in 1 week and then 5 weeks with me \par

## 2022-04-08 ENCOUNTER — OUTPATIENT (OUTPATIENT)
Dept: OUTPATIENT SERVICES | Facility: HOSPITAL | Age: 62
LOS: 1 days | End: 2022-04-08
Payer: MEDICAID

## 2022-04-08 ENCOUNTER — APPOINTMENT (OUTPATIENT)
Dept: INTERNAL MEDICINE | Facility: CLINIC | Age: 62
End: 2022-04-08
Payer: MEDICAID

## 2022-04-08 VITALS
DIASTOLIC BLOOD PRESSURE: 80 MMHG | OXYGEN SATURATION: 98 % | HEIGHT: 57 IN | BODY MASS INDEX: 30.85 KG/M2 | SYSTOLIC BLOOD PRESSURE: 112 MMHG | WEIGHT: 143 LBS | HEART RATE: 88 BPM

## 2022-04-08 DIAGNOSIS — Z90.49 ACQUIRED ABSENCE OF OTHER SPECIFIED PARTS OF DIGESTIVE TRACT: Chronic | ICD-10-CM

## 2022-04-08 DIAGNOSIS — L65.9 NONSCARRING HAIR LOSS, UNSPECIFIED: ICD-10-CM

## 2022-04-08 DIAGNOSIS — R12 HEARTBURN: ICD-10-CM

## 2022-04-08 DIAGNOSIS — E11.65 TYPE 2 DIABETES MELLITUS WITH HYPERGLYCEMIA: ICD-10-CM

## 2022-04-08 DIAGNOSIS — I10 ESSENTIAL (PRIMARY) HYPERTENSION: ICD-10-CM

## 2022-04-08 DIAGNOSIS — Z00.00 ENCOUNTER FOR GENERAL ADULT MEDICAL EXAMINATION WITHOUT ABNORMAL FINDINGS: ICD-10-CM

## 2022-04-08 PROCEDURE — 83036 HEMOGLOBIN GLYCOSYLATED A1C: CPT

## 2022-04-08 PROCEDURE — G0463: CPT | Mod: 25

## 2022-04-08 PROCEDURE — 99396 PREV VISIT EST AGE 40-64: CPT | Mod: GC

## 2022-04-08 PROCEDURE — 80053 COMPREHEN METABOLIC PANEL: CPT

## 2022-04-08 PROCEDURE — 84443 ASSAY THYROID STIM HORMONE: CPT

## 2022-04-08 PROCEDURE — 80061 LIPID PANEL: CPT

## 2022-04-08 PROCEDURE — 85025 COMPLETE CBC W/AUTO DIFF WBC: CPT

## 2022-04-08 PROCEDURE — 82607 VITAMIN B-12: CPT

## 2022-04-08 RX ORDER — ESTRADIOL 10 UG/1
10 TABLET, FILM COATED VAGINAL
Qty: 30 | Refills: 1 | Status: DISCONTINUED | COMMUNITY
Start: 2021-05-26 | End: 2022-04-08

## 2022-04-08 RX ORDER — BLOOD-GLUCOSE METER
W/DEVICE KIT MISCELLANEOUS
Qty: 1 | Refills: 0 | Status: ACTIVE | COMMUNITY
Start: 2018-11-07 | End: 1900-01-01

## 2022-04-08 RX ORDER — SEMAGLUTIDE 1.34 MG/ML
2 INJECTION, SOLUTION SUBCUTANEOUS
Qty: 1 | Refills: 1 | Status: DISCONTINUED | COMMUNITY
Start: 2021-12-07 | End: 2022-04-08

## 2022-04-11 ENCOUNTER — NON-APPOINTMENT (OUTPATIENT)
Age: 62
End: 2022-04-11

## 2022-04-11 LAB
ALBUMIN SERPL ELPH-MCNC: 4.2 G/DL
ALP BLD-CCNC: 80 U/L
ALT SERPL-CCNC: 20 U/L
ANION GAP SERPL CALC-SCNC: 12 MMOL/L
AST SERPL-CCNC: 20 U/L
BASOPHILS # BLD AUTO: 0.1 K/UL
BASOPHILS NFR BLD AUTO: 1 %
BILIRUB SERPL-MCNC: 0.4 MG/DL
BUN SERPL-MCNC: 18 MG/DL
CALCIUM SERPL-MCNC: 9.6 MG/DL
CHLORIDE SERPL-SCNC: 105 MMOL/L
CHOLEST SERPL-MCNC: 225 MG/DL
CO2 SERPL-SCNC: 24 MMOL/L
CREAT SERPL-MCNC: 0.52 MG/DL
EGFR: 106 ML/MIN/1.73M2
EOSINOPHIL # BLD AUTO: 0.5 K/UL
EOSINOPHIL NFR BLD AUTO: 5.1 %
FOLATE SERPL-MCNC: >20 NG/ML
GLUCOSE SERPL-MCNC: 100 MG/DL
HCT VFR BLD CALC: 45.4 %
HDLC SERPL-MCNC: 68 MG/DL
HGB BLD-MCNC: 15 G/DL
IMM GRANULOCYTES NFR BLD AUTO: 0.4 %
LDLC SERPL CALC-MCNC: 144 MG/DL
LYMPHOCYTES # BLD AUTO: 3.8 K/UL
LYMPHOCYTES NFR BLD AUTO: 38.4 %
MAN DIFF?: NORMAL
MCHC RBC-ENTMCNC: 29.9 PG
MCHC RBC-ENTMCNC: 33 GM/DL
MCV RBC AUTO: 90.6 FL
MONOCYTES # BLD AUTO: 0.55 K/UL
MONOCYTES NFR BLD AUTO: 5.6 %
NEUTROPHILS # BLD AUTO: 4.9 K/UL
NEUTROPHILS NFR BLD AUTO: 49.5 %
NONHDLC SERPL-MCNC: 157 MG/DL
PLATELET # BLD AUTO: 282 K/UL
POTASSIUM SERPL-SCNC: 4 MMOL/L
PROT SERPL-MCNC: 6.1 G/DL
RBC # BLD: 5.01 M/UL
RBC # FLD: 13 %
SODIUM SERPL-SCNC: 140 MMOL/L
TRIGL SERPL-MCNC: 62 MG/DL
TSH SERPL-ACNC: 2.23 UIU/ML
VIT B12 SERPL-MCNC: 687 PG/ML
WBC # FLD AUTO: 9.89 K/UL

## 2022-04-27 NOTE — HISTORY OF PRESENT ILLNESS
[Patient Declined  Services] : - None: Patient declined  services [FreeTextEntry1] : CPE [de-identified] : 61F [place] speaker with DM2 and HLD here for CPE.\par \par T2DM:\par Last A1c: 11/21 7.1, POC today 6.9\par Basaglar 12u qPM and metformin 1000 bid, did not know about ozempic ordered at last visit.\par Sugars: Has not been checking sugars at home. No working glucometer and test strips.\par Nephropathy: microalbumin on 6/21 was wnl\par Retinopathy screening: cannot recall last ophtho visit\par \par HCM:\par Cervical Ca: Pap+HPV neg 5/21, due 2026\par Breast Ca: BIRADS 2 6/21, reorder today\par Colon Ca: Colonoscopy 11/21\par Immunizations: Flu [deferred], COVID [Pfizer x3 2/22], P23 [5/19], TDAP [5/13]

## 2022-04-27 NOTE — PHYSICAL EXAM
[No Acute Distress] : no acute distress [Well-Appearing] : well-appearing [Normal Sclera/Conjunctiva] : normal sclera/conjunctiva [PERRL] : pupils equal round and reactive to light [EOMI] : extraocular movements intact [Normal Outer Ear/Nose] : the outer ears and nose were normal in appearance [Normal TMs] : both tympanic membranes were normal [No Lymphadenopathy] : no lymphadenopathy [No Respiratory Distress] : no respiratory distress  [Clear to Auscultation] : lungs were clear to auscultation bilaterally [Normal Rate] : normal rate  [Regular Rhythm] : with a regular rhythm [No Murmur] : no murmur heard [No Edema] : there was no peripheral edema [Soft] : abdomen soft [Non Tender] : non-tender [Non-distended] : non-distended [No CVA Tenderness] : no CVA  tenderness [No Joint Swelling] : no joint swelling [Grossly Normal Strength/Tone] : grossly normal strength/tone [No Rash] : no rash [No Focal Deficits] : no focal deficits [Normal Affect] : the affect was normal [Normal Insight/Judgement] : insight and judgment were intact [Comprehensive Foot Exam Normal] : Right and left foot were examined and both feet are normal. No ulcers in either foot. Toes are normal and with full ROM.  Normal tactile sensation with monofilament testing throughout both feet [de-identified] : +R foot: moist skin folds within interdigitary spaces

## 2022-04-27 NOTE — REVIEW OF SYSTEMS
[Heartburn] : heartburn [Joint Pain] : joint pain [Negative] : Respiratory [Abdominal Pain] : no abdominal pain [Nausea] : no nausea [Constipation] : no constipation [Diarrhea] : diarrhea [Vomiting] : no vomiting [Dysuria] : no dysuria [Incontinence] : no incontinence [Hematuria] : no hematuria [Frequency] : no frequency [Joint Stiffness] : no joint stiffness [Muscle Pain] : no muscle pain [Back Pain] : no back pain [Itching] : no itching [Skin Rash] : no skin rash [Headache] : no headache [Dizziness] : no dizziness [Fainting] : no fainting [Confusion] : no confusion [Insomnia] : no insomnia [Anxiety] : no anxiety [Depression] : no depression [FreeTextEntry9] : Intermittent knee pain

## 2022-06-15 ENCOUNTER — OUTPATIENT (OUTPATIENT)
Dept: OUTPATIENT SERVICES | Facility: HOSPITAL | Age: 62
LOS: 1 days | End: 2022-06-15
Payer: MEDICAID

## 2022-06-15 ENCOUNTER — RESULT REVIEW (OUTPATIENT)
Age: 62
End: 2022-06-15

## 2022-06-15 ENCOUNTER — APPOINTMENT (OUTPATIENT)
Dept: MAMMOGRAPHY | Facility: IMAGING CENTER | Age: 62
End: 2022-06-15
Payer: MEDICAID

## 2022-06-15 DIAGNOSIS — Z90.49 ACQUIRED ABSENCE OF OTHER SPECIFIED PARTS OF DIGESTIVE TRACT: Chronic | ICD-10-CM

## 2022-06-15 DIAGNOSIS — Z00.00 ENCOUNTER FOR GENERAL ADULT MEDICAL EXAMINATION WITHOUT ABNORMAL FINDINGS: ICD-10-CM

## 2022-06-15 PROCEDURE — 77063 BREAST TOMOSYNTHESIS BI: CPT

## 2022-06-15 PROCEDURE — 77067 SCR MAMMO BI INCL CAD: CPT | Mod: 26

## 2022-06-15 PROCEDURE — 77067 SCR MAMMO BI INCL CAD: CPT

## 2022-06-15 PROCEDURE — 77063 BREAST TOMOSYNTHESIS BI: CPT | Mod: 26

## 2022-06-16 ENCOUNTER — NON-APPOINTMENT (OUTPATIENT)
Age: 62
End: 2022-06-16

## 2022-07-26 ENCOUNTER — APPOINTMENT (OUTPATIENT)
Dept: OPHTHALMOLOGY | Facility: CLINIC | Age: 62
End: 2022-07-26

## 2022-07-26 ENCOUNTER — NON-APPOINTMENT (OUTPATIENT)
Age: 62
End: 2022-07-26

## 2022-07-26 PROCEDURE — 92004 COMPRE OPH EXAM NEW PT 1/>: CPT

## 2022-08-02 ENCOUNTER — OUTPATIENT (OUTPATIENT)
Dept: OUTPATIENT SERVICES | Facility: HOSPITAL | Age: 62
LOS: 1 days | End: 2022-08-02
Payer: MEDICAID

## 2022-08-02 ENCOUNTER — APPOINTMENT (OUTPATIENT)
Dept: INTERNAL MEDICINE | Facility: CLINIC | Age: 62
End: 2022-08-02

## 2022-08-02 VITALS
HEIGHT: 57 IN | BODY MASS INDEX: 31.71 KG/M2 | WEIGHT: 147 LBS | HEART RATE: 84 BPM | OXYGEN SATURATION: 98 % | DIASTOLIC BLOOD PRESSURE: 75 MMHG | SYSTOLIC BLOOD PRESSURE: 110 MMHG

## 2022-08-02 DIAGNOSIS — I10 ESSENTIAL (PRIMARY) HYPERTENSION: ICD-10-CM

## 2022-08-02 DIAGNOSIS — Z90.49 ACQUIRED ABSENCE OF OTHER SPECIFIED PARTS OF DIGESTIVE TRACT: Chronic | ICD-10-CM

## 2022-08-02 PROCEDURE — 99213 OFFICE O/P EST LOW 20 MIN: CPT | Mod: GE

## 2022-08-02 PROCEDURE — G0463: CPT

## 2022-08-02 RX ORDER — ELECTROLYTES/DEXTROSE
32G X 4 MM SOLUTION, ORAL ORAL
Qty: 60 | Refills: 3 | Status: ACTIVE | COMMUNITY
Start: 2021-04-19 | End: 1900-01-01

## 2022-08-02 RX ORDER — METRONIDAZOLE 7.5 MG/G
0.75 GEL VAGINAL
Qty: 1 | Refills: 0 | Status: DISCONTINUED | COMMUNITY
Start: 2021-04-02 | End: 2022-08-02

## 2022-08-02 RX ORDER — LANCETS 33 GAUGE
EACH MISCELLANEOUS
Qty: 2 | Refills: 3 | Status: ACTIVE | COMMUNITY
Start: 2021-04-21 | End: 1900-01-01

## 2022-08-02 NOTE — ASSESSMENT
[FreeTextEntry1] : #HCM\par -pap negative 5/2021\par -mammo birads 1 \par -cscope done 11/21\par -tdap done 5/13\par -covid vacc x3\par -[id number] 2/22\par \par \par RTC in 3 months

## 2022-08-02 NOTE — HISTORY OF PRESENT ILLNESS
[FreeTextEntry1] : A1c check [de-identified] : 61F with h/o DM2 and HLD presenting for A1c check.\par \par #DM2\par -POC A1c 7.7, last was 6.9\par -FS at home 130s premeal, highest she has seen was 210\par -no hypoglycemic episodes\par -on metformin 100BID and basaglar 12u qhs, states good adherence\par - has been on humalog in the past, has not tried sglt2i\par \par #R knee pain\par -3 months ago felt R knee gave way and has been having pain since then\par -feels occasional twinges of sharp 8/10 pain when internally rotating knee that immediately resolve\par -pain is in muscle of calf, not in knee joint\par -has been taking OTC pain meds which have helped

## 2022-08-02 NOTE — PHYSICAL EXAM
[No Acute Distress] : no acute distress [Well Nourished] : well nourished [Well Developed] : well developed [Well-Appearing] : well-appearing [Normal Sclera/Conjunctiva] : normal sclera/conjunctiva [PERRL] : pupils equal round and reactive to light [EOMI] : extraocular movements intact [Normal Outer Ear/Nose] : the outer ears and nose were normal in appearance [Normal Oropharynx] : the oropharynx was normal [No JVD] : no jugular venous distention [No Lymphadenopathy] : no lymphadenopathy [Supple] : supple [Thyroid Normal, No Nodules] : the thyroid was normal and there were no nodules present [No Respiratory Distress] : no respiratory distress  [No Accessory Muscle Use] : no accessory muscle use [Clear to Auscultation] : lungs were clear to auscultation bilaterally [Normal Rate] : normal rate  [Regular Rhythm] : with a regular rhythm [Normal S1, S2] : normal S1 and S2 [No Murmur] : no murmur heard [No Carotid Bruits] : no carotid bruits [No Abdominal Bruit] : a ~M bruit was not heard ~T in the abdomen [No Varicosities] : no varicosities [Pedal Pulses Present] : the pedal pulses are present [No Edema] : there was no peripheral edema [No Palpable Aorta] : no palpable aorta [No Extremity Clubbing/Cyanosis] : no extremity clubbing/cyanosis [Soft] : abdomen soft [Non Tender] : non-tender [Non-distended] : non-distended [No Masses] : no abdominal mass palpated [No HSM] : no HSM [Normal Bowel Sounds] : normal bowel sounds [Normal Posterior Cervical Nodes] : no posterior cervical lymphadenopathy [Normal Anterior Cervical Nodes] : no anterior cervical lymphadenopathy [No CVA Tenderness] : no CVA  tenderness [No Spinal Tenderness] : no spinal tenderness [No Joint Swelling] : no joint swelling [Grossly Normal Strength/Tone] : grossly normal strength/tone [No Rash] : no rash [Coordination Grossly Intact] : coordination grossly intact [No Focal Deficits] : no focal deficits [Normal Gait] : normal gait [Deep Tendon Reflexes (DTR)] : deep tendon reflexes were 2+ and symmetric [Normal Affect] : the affect was normal [Normal Insight/Judgement] : insight and judgment were intact [de-identified] : R knee negative anterior and posterior drawer signs, negative lachman, negative patellar grind test, calf nontender to palpation, no swelling, redness, or warmth

## 2022-08-11 DIAGNOSIS — M25.561 PAIN IN RIGHT KNEE: ICD-10-CM

## 2022-08-24 RX ORDER — BLOOD-GLUCOSE METER
KIT MISCELLANEOUS
Qty: 1 | Refills: 0 | Status: ACTIVE | COMMUNITY
Start: 2022-08-24 | End: 1900-01-01

## 2022-11-03 ENCOUNTER — OUTPATIENT (OUTPATIENT)
Dept: OUTPATIENT SERVICES | Facility: HOSPITAL | Age: 62
LOS: 1 days | End: 2022-11-03
Payer: MEDICAID

## 2022-11-03 ENCOUNTER — APPOINTMENT (OUTPATIENT)
Dept: INTERNAL MEDICINE | Facility: CLINIC | Age: 62
End: 2022-11-03

## 2022-11-03 VITALS
HEART RATE: 86 BPM | DIASTOLIC BLOOD PRESSURE: 70 MMHG | SYSTOLIC BLOOD PRESSURE: 110 MMHG | HEIGHT: 57 IN | WEIGHT: 148 LBS | BODY MASS INDEX: 31.93 KG/M2 | OXYGEN SATURATION: 98 %

## 2022-11-03 DIAGNOSIS — I10 ESSENTIAL (PRIMARY) HYPERTENSION: ICD-10-CM

## 2022-11-03 DIAGNOSIS — Z90.49 ACQUIRED ABSENCE OF OTHER SPECIFIED PARTS OF DIGESTIVE TRACT: Chronic | ICD-10-CM

## 2022-11-03 DIAGNOSIS — Z00.00 ENCOUNTER FOR GENERAL ADULT MEDICAL EXAMINATION W/OUT ABNORMAL FINDINGS: ICD-10-CM

## 2022-11-03 DIAGNOSIS — M25.561 PAIN IN RIGHT KNEE: ICD-10-CM

## 2022-11-03 LAB — HBA1C MFR BLD HPLC: 7.3

## 2022-11-03 PROCEDURE — G0463: CPT

## 2022-11-03 PROCEDURE — 99213 OFFICE O/P EST LOW 20 MIN: CPT | Mod: GE

## 2022-11-03 RX ORDER — FAMOTIDINE 20 MG/1
20 TABLET, FILM COATED ORAL
Qty: 120 | Refills: 1 | Status: ACTIVE | COMMUNITY
Start: 2021-10-12 | End: 1900-01-01

## 2022-11-03 RX ORDER — EMPAGLIFLOZIN 10 MG/1
10 TABLET, FILM COATED ORAL DAILY
Qty: 30 | Refills: 3 | Status: DISCONTINUED | COMMUNITY
Start: 2022-08-02 | End: 2022-11-03

## 2022-11-04 PROBLEM — M25.561 KNEE PAIN, RIGHT: Status: ACTIVE | Noted: 2022-08-02

## 2022-11-04 PROBLEM — Z00.00 ENCOUNTER FOR PREVENTIVE HEALTH EXAMINATION: Status: ACTIVE | Noted: 2022-04-08

## 2022-11-06 NOTE — HISTORY OF PRESENT ILLNESS
[FreeTextEntry1] : A1c f/u  [de-identified] : 62Y F w/ PMH of GERD, HLD, DM2 presents to the clin for routine follow up. \par \par #R. knee pain\par Pt endorses 7 out of 10, intermittent, non-radiating, R. knee pain that worsens with flexion and is associated with edema for 1 month. Pt notes that the knee pain initially started when she was walking up a flight of stairs and felt as though she was about to fall.  Pt reports taking Tylenol and Advil but it did not help. Denies fever, nausea, vomiting, changes in BM, or dysuria. \par \par #DM2 \par Patient reports doing well and notes sugars at home are usually in the 120's - 130's range. She endorses a fairly healthy diet consisting of home cooked meals with proteins and veggies. Pt saw opthalmology earlier this year and denies blurry vision. No nausea, vomiting, chest pain, headache, or urinary frequency. \par \par strength quads, knee sleeve, and meloxicam 7 days

## 2022-11-06 NOTE — ASSESSMENT
[FreeTextEntry1] : 62Y F w/ PMH of GERD, HLD, DM2 presents to the clinic for follow up \par \par #DM2 \par - on metformin, insulin, jardiance \par - POC A1c: 7.3 \par - Increased jardiance to 25 mg will end goal of reducing insulin\par \par #R. Knee pain \par Likely osteoarthritis\par - Recommend knee sleeve\par - Meloxicam 7.5 mg TID for 5 days then as needed  \par \par #HLD\par - C/w atorvastatin \par \par #GERD \par - C/w famotidine

## 2022-11-08 DIAGNOSIS — M25.561 PAIN IN RIGHT KNEE: ICD-10-CM

## 2023-02-16 ENCOUNTER — APPOINTMENT (OUTPATIENT)
Dept: INTERNAL MEDICINE | Facility: CLINIC | Age: 63
End: 2023-02-16
Payer: MEDICAID

## 2023-02-16 VITALS
HEART RATE: 86 BPM | WEIGHT: 146 LBS | DIASTOLIC BLOOD PRESSURE: 78 MMHG | BODY MASS INDEX: 31.5 KG/M2 | HEIGHT: 57 IN | SYSTOLIC BLOOD PRESSURE: 118 MMHG | OXYGEN SATURATION: 98 %

## 2023-02-16 LAB — HBA1C MFR BLD HPLC: 8.4

## 2023-02-16 PROCEDURE — 99396 PREV VISIT EST AGE 40-64: CPT | Mod: GC

## 2023-02-16 RX ORDER — BLOOD-GLUCOSE METER
KIT MISCELLANEOUS
Qty: 2 | Refills: 3 | Status: ACTIVE | COMMUNITY
Start: 2021-04-21 | End: 1900-01-01

## 2023-02-16 RX ORDER — ISOPROPYL ALCOHOL 0.7 ML/ML
SWAB TOPICAL
Qty: 1 | Refills: 2 | Status: ACTIVE | COMMUNITY
Start: 2018-11-07 | End: 1900-01-01

## 2023-02-17 LAB
ALBUMIN SERPL ELPH-MCNC: 4.2 G/DL
ALP BLD-CCNC: 81 U/L
ALT SERPL-CCNC: 19 U/L
ANION GAP SERPL CALC-SCNC: 13 MMOL/L
AST SERPL-CCNC: 17 U/L
BASOPHILS # BLD AUTO: 0.11 K/UL
BASOPHILS NFR BLD AUTO: 1.3 %
BILIRUB SERPL-MCNC: 0.2 MG/DL
BUN SERPL-MCNC: 18 MG/DL
CALCIUM SERPL-MCNC: 9.7 MG/DL
CHLORIDE SERPL-SCNC: 102 MMOL/L
CHOLEST SERPL-MCNC: 223 MG/DL
CO2 SERPL-SCNC: 25 MMOL/L
CREAT SERPL-MCNC: 0.5 MG/DL
EGFR: 106 ML/MIN/1.73M2
EOSINOPHIL # BLD AUTO: 0.75 K/UL
EOSINOPHIL NFR BLD AUTO: 9.1 %
GLUCOSE SERPL-MCNC: 139 MG/DL
HCT VFR BLD CALC: 42.8 %
HDLC SERPL-MCNC: 64 MG/DL
HGB BLD-MCNC: 14.6 G/DL
IMM GRANULOCYTES NFR BLD AUTO: 0.6 %
LDLC SERPL CALC-MCNC: 144 MG/DL
LYMPHOCYTES # BLD AUTO: 3.2 K/UL
LYMPHOCYTES NFR BLD AUTO: 38.7 %
MAN DIFF?: NORMAL
MCHC RBC-ENTMCNC: 30.6 PG
MCHC RBC-ENTMCNC: 34.1 GM/DL
MCV RBC AUTO: 89.7 FL
MONOCYTES # BLD AUTO: 0.52 K/UL
MONOCYTES NFR BLD AUTO: 6.3 %
NEUTROPHILS # BLD AUTO: 3.63 K/UL
NEUTROPHILS NFR BLD AUTO: 44 %
NONHDLC SERPL-MCNC: 159 MG/DL
PLATELET # BLD AUTO: 328 K/UL
POTASSIUM SERPL-SCNC: 4.5 MMOL/L
PROT SERPL-MCNC: 6.4 G/DL
RBC # BLD: 4.77 M/UL
RBC # FLD: 12.6 %
SODIUM SERPL-SCNC: 140 MMOL/L
TRIGL SERPL-MCNC: 75 MG/DL
WBC # FLD AUTO: 8.26 K/UL

## 2023-02-20 NOTE — ASSESSMENT
[FreeTextEntry1] : 62Y F w/ PMH GERD, HLD, DM2 (A1c: 8.4) presents for CPE \par \par #DM2 (A1c: 8.4) \par - On metformin 1000 mg BID; jardiance not covered --> steglatro 15 mg QD \par - foot exam wnl; UTD optho exam \par - repeat A1c in 3 months \par \par #HLD\par - C/w atorvastatin \par \par #GERD\par - c/w famotidine \par \par #HCM \par - UTD on screenings and vaccines \par - CBC, CMP, Lipid profile \par \par RTC in 3 months \par Case discussed w/ Dr. Foreman

## 2023-02-20 NOTE — INTERPRETER SERVICES
[[employer] ] : provided by [employer]   [Interpreters_IDNumber] : [id number] [TWNoteComboBox1] : [place]ij[place]

## 2023-02-20 NOTE — HISTORY OF PRESENT ILLNESS
[FreeTextEntry1] : CPE  [de-identified] : 62Y w/ PMH GERD, HLD, DM2 (A1c: 7.3) presents to the clinic for CPE. Pt reports doing well and does not have any acute complaints. Pt states over the past 3 months she has had 1-2x episodes of elevated FS (190's - 220's). Pt has been taking metformin 1000 mg BID but not jardiance 25 mg QD or basaglar because she thought she did not need to take these medications anymore. POC A1c today 8.4. Pt  denies fever, headaches, nausea, vomiting, blurry vision, chest pain, SOB, palpitations.

## 2023-06-01 ENCOUNTER — MED ADMIN CHARGE (OUTPATIENT)
Age: 63
End: 2023-06-01

## 2023-06-01 ENCOUNTER — OUTPATIENT (OUTPATIENT)
Dept: OUTPATIENT SERVICES | Facility: HOSPITAL | Age: 63
LOS: 1 days | End: 2023-06-01
Payer: MEDICAID

## 2023-06-01 ENCOUNTER — APPOINTMENT (OUTPATIENT)
Dept: INTERNAL MEDICINE | Facility: CLINIC | Age: 63
End: 2023-06-01
Payer: MEDICAID

## 2023-06-01 ENCOUNTER — RESULT REVIEW (OUTPATIENT)
Age: 63
End: 2023-06-01

## 2023-06-01 VITALS
HEART RATE: 85 BPM | HEIGHT: 57 IN | DIASTOLIC BLOOD PRESSURE: 82 MMHG | WEIGHT: 135 LBS | BODY MASS INDEX: 29.12 KG/M2 | SYSTOLIC BLOOD PRESSURE: 110 MMHG | OXYGEN SATURATION: 98 %

## 2023-06-01 DIAGNOSIS — E11.9 TYPE 2 DIABETES MELLITUS WITHOUT COMPLICATIONS: ICD-10-CM

## 2023-06-01 DIAGNOSIS — Z00.00 ENCOUNTER FOR GENERAL ADULT MEDICAL EXAMINATION WITHOUT ABNORMAL FINDINGS: ICD-10-CM

## 2023-06-01 DIAGNOSIS — I10 ESSENTIAL (PRIMARY) HYPERTENSION: ICD-10-CM

## 2023-06-01 PROCEDURE — 82043 UR ALBUMIN QUANTITATIVE: CPT

## 2023-06-01 PROCEDURE — 99213 OFFICE O/P EST LOW 20 MIN: CPT | Mod: GE

## 2023-06-01 PROCEDURE — 83036 HEMOGLOBIN GLYCOSYLATED A1C: CPT

## 2023-06-01 PROCEDURE — G0463: CPT

## 2023-06-02 LAB
CREAT SPEC-SCNC: 135 MG/DL
ESTIMATED AVERAGE GLUCOSE: 174 MG/DL
HBA1C MFR BLD HPLC: 7.7 %
MICROALBUMIN 24H UR DL<=1MG/L-MCNC: <1.2 MG/DL
MICROALBUMIN/CREAT 24H UR-RTO: NORMAL MG/G

## 2023-06-08 RX ORDER — BLOOD SUGAR DIAGNOSTIC
STRIP MISCELLANEOUS TWICE DAILY
Qty: 1 | Refills: 3 | Status: ACTIVE | COMMUNITY
Start: 2022-08-24 | End: 1900-01-01

## 2023-06-08 RX ORDER — ERTUGLIFLOZIN 15 MG/1
15 TABLET, FILM COATED ORAL
Qty: 90 | Refills: 3 | Status: ACTIVE | COMMUNITY
Start: 2023-02-17 | End: 1900-01-01

## 2023-06-16 ENCOUNTER — RESULT REVIEW (OUTPATIENT)
Age: 63
End: 2023-06-16

## 2023-06-16 ENCOUNTER — OUTPATIENT (OUTPATIENT)
Dept: OUTPATIENT SERVICES | Facility: HOSPITAL | Age: 63
LOS: 1 days | End: 2023-06-16
Payer: MEDICAID

## 2023-06-16 ENCOUNTER — APPOINTMENT (OUTPATIENT)
Dept: MAMMOGRAPHY | Facility: IMAGING CENTER | Age: 63
End: 2023-06-16
Payer: MEDICAID

## 2023-06-16 DIAGNOSIS — Z90.49 ACQUIRED ABSENCE OF OTHER SPECIFIED PARTS OF DIGESTIVE TRACT: Chronic | ICD-10-CM

## 2023-06-16 DIAGNOSIS — Z00.8 ENCOUNTER FOR OTHER GENERAL EXAMINATION: ICD-10-CM

## 2023-06-16 PROCEDURE — 77063 BREAST TOMOSYNTHESIS BI: CPT

## 2023-06-16 PROCEDURE — 77067 SCR MAMMO BI INCL CAD: CPT | Mod: 26

## 2023-06-16 PROCEDURE — 77063 BREAST TOMOSYNTHESIS BI: CPT | Mod: 26

## 2023-06-16 PROCEDURE — 77067 SCR MAMMO BI INCL CAD: CPT

## 2023-06-19 ENCOUNTER — NON-APPOINTMENT (OUTPATIENT)
Age: 63
End: 2023-06-19

## 2023-07-11 NOTE — HISTORY OF PRESENT ILLNESS
[FreeTextEntry1] : A1c check  [de-identified] : 62Y w/ PMH of GERD, HLD, DM2 (A1c: 8.4;02/23) presents to the clinic for A1c check. Pt reports doing well and denies any acute complaints. Reports regularly checking blood sugars at home (typical 's-140's). Reports 's x2 in 3 months and lowest FS 87. Endorses a good diet (home cooked meals with veggies) and physically active. ***Only take Stegaltro for 1 month and was out of medication but took metformin regularly***. Denies fever, headache, nausea, vomiting, CP, SOB, changes in BM or dysuria.

## 2023-07-11 NOTE — ASSESSMENT
[FreeTextEntry1] : 62Y F w/ PMH of HLD, DM2 (A1c: 8.4;02/23) presents to the clinic for A1c check\par \par #DM2 \par On home: metformin 1000 mg BID; steglatro 15 mg QD (out of med for 2 months)\par - Refill meds; Repeat A1c today (POC not functional) \par - F/u Microalbumin \par \par #HLD\par - C/w atorvastatin 20 mg QD \par \par #Health Maintainence \par - Mammo referral; Dental Referral; Optho referral\par - Tdap vaccine \par \par RTC in 3 months\par Case Discussed w/ Dr. Foreman

## 2023-09-13 ENCOUNTER — OUTPATIENT (OUTPATIENT)
Dept: OUTPATIENT SERVICES | Facility: HOSPITAL | Age: 63
LOS: 1 days | End: 2023-09-13
Payer: MEDICAID

## 2023-09-13 ENCOUNTER — APPOINTMENT (OUTPATIENT)
Dept: INTERNAL MEDICINE | Facility: CLINIC | Age: 63
End: 2023-09-13
Payer: MEDICAID

## 2023-09-13 VITALS
WEIGHT: 134 LBS | DIASTOLIC BLOOD PRESSURE: 80 MMHG | HEIGHT: 57 IN | SYSTOLIC BLOOD PRESSURE: 118 MMHG | HEART RATE: 95 BPM | BODY MASS INDEX: 28.91 KG/M2 | OXYGEN SATURATION: 98 %

## 2023-09-13 DIAGNOSIS — I10 ESSENTIAL (PRIMARY) HYPERTENSION: ICD-10-CM

## 2023-09-13 DIAGNOSIS — Z90.49 ACQUIRED ABSENCE OF OTHER SPECIFIED PARTS OF DIGESTIVE TRACT: Chronic | ICD-10-CM

## 2023-09-13 LAB — HBA1C MFR BLD HPLC: 7.4

## 2023-09-13 PROCEDURE — 83036 HEMOGLOBIN GLYCOSYLATED A1C: CPT

## 2023-09-13 PROCEDURE — 99213 OFFICE O/P EST LOW 20 MIN: CPT | Mod: GE,25

## 2023-09-13 PROCEDURE — G0463: CPT | Mod: 25

## 2023-09-19 DIAGNOSIS — E78.5 HYPERLIPIDEMIA, UNSPECIFIED: ICD-10-CM

## 2023-09-19 DIAGNOSIS — Z00.00 ENCOUNTER FOR GENERAL ADULT MEDICAL EXAMINATION WITHOUT ABNORMAL FINDINGS: ICD-10-CM

## 2023-09-19 NOTE — DISCUSSION/SUMMARY
2-3 weeks. MD.    Pt is for discharge home today with no needs/supportive care orders received for CM at this time.   Pt will have close follow up with PCP  Milestones met    ASSESSMENT NOTE    Attending Physician: Jax Gómez MD  Admit Problem: Esophageal dysphagia [R13.19]  Dysphagia [R13.10]  Date/Time of Admission: 9/18/2023  9:58 AM  Problem List:  Patient Active Problem List   Diagnosis    Tachycardia    Otogenic otalgia    Vitamin D deficiency    Pleural tuberculosis    Mixed conductive and sensorineural hearing loss of right ear    Thornwaldt's cyst    Impaired fasting glucose    Left otitis media with effusion    Mixed hyperlipidemia    HTN (hypertension)    Otorrhea of right ear    Pleural effusion    Weight loss    Hyponatremia    Cachexia (HCC)    GERD without esophagitis    Adenopathy    Mediastinal lymphadenopathy    Squamous cell esophageal cancer (HCC)    History of cigarette smoking    Dysphagia       Service Assessment  Patient Orientation Alert and Oriented   Cognition Alert   History Provided By Medical Record   Primary Caregiver Self   Accompanied By/Relationship n/a   Support Systems (P) Spouse/Significant Other   Patient's Healthcare Decision Maker is: (P) Legal Next of Kin   PCP Verified by CM (P) Yes   Last Visit to PCP (P) Within last 3 months   Prior Functional Level (P) Independent in ADLs/IADLs   Current Functional Level (P) Independent in ADLs/IADLs   Can patient return to prior living arrangement (P) Yes   Ability to make needs known: (P) Good   Family able to assist with home care needs: (P) Yes   Would you like for me to discuss the discharge plan with any other family members/significant others, and if so, who? 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 \par # Gen\par - [ ]pap/hpv collected\par -[ ]mammo referral\par -[ ]GI referral- due for rpt colonoscopy\par - Gen health followed by Medicine (DM/HDL)\par # bloating/ vague\par - ? fam hx (sister  of "female cancer"- not breast)\par - [ ]pelvic sono \par # vag atrophy\par - vagifem rx.   Pt to call for any PMB\par # Tinea\par - Nystatin powder to breast folds/ groin folds qd\par \par Will call with pelvic sono result\par RTC for hema/ prn or with any PMB\par Leigha Puckett, PAC\par

## 2023-11-03 ENCOUNTER — APPOINTMENT (OUTPATIENT)
Dept: INTERNAL MEDICINE | Facility: CLINIC | Age: 63
End: 2023-11-03
Payer: MEDICAID

## 2023-11-03 ENCOUNTER — OUTPATIENT (OUTPATIENT)
Dept: OUTPATIENT SERVICES | Facility: HOSPITAL | Age: 63
LOS: 1 days | End: 2023-11-03
Payer: MEDICAID

## 2023-11-03 VITALS
WEIGHT: 135 LBS | DIASTOLIC BLOOD PRESSURE: 70 MMHG | OXYGEN SATURATION: 98 % | HEIGHT: 57 IN | SYSTOLIC BLOOD PRESSURE: 120 MMHG | HEART RATE: 91 BPM | BODY MASS INDEX: 29.12 KG/M2

## 2023-11-03 DIAGNOSIS — I10 ESSENTIAL (PRIMARY) HYPERTENSION: ICD-10-CM

## 2023-11-03 DIAGNOSIS — Z90.49 ACQUIRED ABSENCE OF OTHER SPECIFIED PARTS OF DIGESTIVE TRACT: Chronic | ICD-10-CM

## 2023-11-03 DIAGNOSIS — N89.8 OTHER SPECIFIED NONINFLAMMATORY DISORDERS OF VAGINA: ICD-10-CM

## 2023-11-03 LAB — HBA1C MFR BLD HPLC: 7.7

## 2023-11-03 PROCEDURE — 87661 TRICHOMONAS VAGINALIS AMPLIF: CPT

## 2023-11-03 PROCEDURE — 87491 CHLMYD TRACH DNA AMP PROBE: CPT

## 2023-11-03 PROCEDURE — 87800 DETECT AGNT MULT DNA DIREC: CPT

## 2023-11-03 PROCEDURE — 87591 N.GONORRHOEAE DNA AMP PROB: CPT

## 2023-11-03 PROCEDURE — 99213 OFFICE O/P EST LOW 20 MIN: CPT | Mod: GE,25

## 2023-11-03 RX ORDER — MELOXICAM 7.5 MG/1
7.5 TABLET ORAL DAILY
Qty: 10 | Refills: 0 | Status: DISCONTINUED | COMMUNITY
Start: 2022-11-03 | End: 2023-11-03

## 2023-11-03 RX ORDER — NYSTATIN 100000 1/G
100000 POWDER TOPICAL
Qty: 1 | Refills: 3 | Status: DISCONTINUED | COMMUNITY
Start: 2021-05-26 | End: 2023-11-03

## 2023-11-03 RX ORDER — INSULIN GLARGINE 100 [IU]/ML
100 INJECTION, SOLUTION SUBCUTANEOUS
Qty: 1 | Refills: 3 | Status: DISCONTINUED | COMMUNITY
Start: 2021-04-21 | End: 2023-11-03

## 2023-11-08 DIAGNOSIS — Z00.00 ENCOUNTER FOR GENERAL ADULT MEDICAL EXAMINATION WITHOUT ABNORMAL FINDINGS: ICD-10-CM

## 2023-11-08 DIAGNOSIS — N89.8 OTHER SPECIFIED NONINFLAMMATORY DISORDERS OF VAGINA: ICD-10-CM

## 2023-11-08 RX ORDER — CONJUGATED ESTROGENS 0.62 MG/G
0.62 CREAM VAGINAL
Qty: 1 | Refills: 0 | Status: ACTIVE | COMMUNITY
Start: 2023-11-08 | End: 1900-01-01

## 2023-11-09 LAB
CANDIDA VAG CYTO: NOT DETECTED
G VAGINALIS+PREV SP MTYP VAG QL MICRO: NOT DETECTED
T VAGINALIS VAG QL WET PREP: NOT DETECTED

## 2023-11-16 ENCOUNTER — APPOINTMENT (OUTPATIENT)
Dept: INTERNAL MEDICINE | Facility: CLINIC | Age: 63
End: 2023-11-16
Payer: MEDICAID

## 2023-11-16 ENCOUNTER — OUTPATIENT (OUTPATIENT)
Dept: OUTPATIENT SERVICES | Facility: HOSPITAL | Age: 63
LOS: 1 days | End: 2023-11-16
Payer: MEDICAID

## 2023-11-16 VITALS
HEART RATE: 90 BPM | BODY MASS INDEX: 29.12 KG/M2 | OXYGEN SATURATION: 98 % | WEIGHT: 135 LBS | HEIGHT: 57 IN | SYSTOLIC BLOOD PRESSURE: 110 MMHG | DIASTOLIC BLOOD PRESSURE: 70 MMHG

## 2023-11-16 DIAGNOSIS — R21 RASH AND OTHER NONSPECIFIC SKIN ERUPTION: ICD-10-CM

## 2023-11-16 DIAGNOSIS — Z90.49 ACQUIRED ABSENCE OF OTHER SPECIFIED PARTS OF DIGESTIVE TRACT: Chronic | ICD-10-CM

## 2023-11-16 DIAGNOSIS — I10 ESSENTIAL (PRIMARY) HYPERTENSION: ICD-10-CM

## 2023-11-16 PROCEDURE — 99213 OFFICE O/P EST LOW 20 MIN: CPT

## 2023-11-16 PROCEDURE — G0463: CPT

## 2023-11-28 ENCOUNTER — APPOINTMENT (OUTPATIENT)
Dept: OPHTHALMOLOGY | Facility: CLINIC | Age: 63
End: 2023-11-28
Payer: MEDICAID

## 2023-11-28 ENCOUNTER — NON-APPOINTMENT (OUTPATIENT)
Age: 63
End: 2023-11-28

## 2023-11-28 PROCEDURE — 92014 COMPRE OPH EXAM EST PT 1/>: CPT

## 2023-11-28 PROCEDURE — 92250 FUNDUS PHOTOGRAPHY W/I&R: CPT

## 2023-12-27 ENCOUNTER — APPOINTMENT (OUTPATIENT)
Dept: INTERNAL MEDICINE | Facility: CLINIC | Age: 63
End: 2023-12-27

## 2024-03-05 ENCOUNTER — APPOINTMENT (OUTPATIENT)
Dept: INTERNAL MEDICINE | Facility: CLINIC | Age: 64
End: 2024-03-05
Payer: MEDICAID

## 2024-03-05 ENCOUNTER — OUTPATIENT (OUTPATIENT)
Dept: OUTPATIENT SERVICES | Facility: HOSPITAL | Age: 64
LOS: 1 days | End: 2024-03-05
Payer: MEDICAID

## 2024-03-05 VITALS
HEART RATE: 92 BPM | OXYGEN SATURATION: 98 % | HEIGHT: 57 IN | DIASTOLIC BLOOD PRESSURE: 70 MMHG | BODY MASS INDEX: 27.4 KG/M2 | SYSTOLIC BLOOD PRESSURE: 116 MMHG | WEIGHT: 127 LBS

## 2024-03-05 DIAGNOSIS — I10 ESSENTIAL (PRIMARY) HYPERTENSION: ICD-10-CM

## 2024-03-05 DIAGNOSIS — M25.511 PAIN IN RIGHT SHOULDER: ICD-10-CM

## 2024-03-05 DIAGNOSIS — E78.5 HYPERLIPIDEMIA, UNSPECIFIED: ICD-10-CM

## 2024-03-05 DIAGNOSIS — Z90.49 ACQUIRED ABSENCE OF OTHER SPECIFIED PARTS OF DIGESTIVE TRACT: Chronic | ICD-10-CM

## 2024-03-05 DIAGNOSIS — Z00.00 ENCOUNTER FOR GENERAL ADULT MEDICAL EXAMINATION W/OUT ABNORMAL FINDINGS: ICD-10-CM

## 2024-03-05 LAB — HBA1C MFR BLD HPLC: 7.5

## 2024-03-05 PROCEDURE — 83695 ASSAY OF LIPOPROTEIN(A): CPT

## 2024-03-05 PROCEDURE — 83036 HEMOGLOBIN GLYCOSYLATED A1C: CPT

## 2024-03-05 PROCEDURE — G0463: CPT

## 2024-03-05 PROCEDURE — 80061 LIPID PANEL: CPT

## 2024-03-05 PROCEDURE — 99213 OFFICE O/P EST LOW 20 MIN: CPT | Mod: GE

## 2024-03-05 PROCEDURE — 82043 UR ALBUMIN QUANTITATIVE: CPT

## 2024-03-05 PROCEDURE — 80053 COMPREHEN METABOLIC PANEL: CPT

## 2024-03-05 PROCEDURE — 85027 COMPLETE CBC AUTOMATED: CPT

## 2024-03-06 LAB
ALBUMIN SERPL ELPH-MCNC: 3.9 G/DL
ALP BLD-CCNC: 59 U/L
ALT SERPL-CCNC: 17 U/L
ANION GAP SERPL CALC-SCNC: 15 MMOL/L
AST SERPL-CCNC: 16 U/L
BILIRUB SERPL-MCNC: 0.3 MG/DL
BUN SERPL-MCNC: 11 MG/DL
CALCIUM SERPL-MCNC: 9.6 MG/DL
CHLORIDE SERPL-SCNC: 104 MMOL/L
CHOLEST SERPL-MCNC: 183 MG/DL
CO2 SERPL-SCNC: 24 MMOL/L
CREAT SERPL-MCNC: 0.47 MG/DL
CREAT SPEC-SCNC: 116 MG/DL
EGFR: 107 ML/MIN/1.73M2
GLUCOSE SERPL-MCNC: 100 MG/DL
HCT VFR BLD CALC: 45 %
HDLC SERPL-MCNC: 57 MG/DL
HGB BLD-MCNC: 15.1 G/DL
LDLC SERPL CALC-MCNC: 111 MG/DL
MCHC RBC-ENTMCNC: 30 PG
MCHC RBC-ENTMCNC: 33.6 GM/DL
MCV RBC AUTO: 89.3 FL
MICROALBUMIN 24H UR DL<=1MG/L-MCNC: 1.2 MG/DL
MICROALBUMIN/CREAT 24H UR-RTO: NORMAL MG/G
NONHDLC SERPL-MCNC: 125 MG/DL
PLATELET # BLD AUTO: 332 K/UL
POTASSIUM SERPL-SCNC: 3.9 MMOL/L
PROT SERPL-MCNC: 6.3 G/DL
RBC # BLD: 5.04 M/UL
RBC # FLD: 13.3 %
SODIUM SERPL-SCNC: 142 MMOL/L
TRIGL SERPL-MCNC: 76 MG/DL
WBC # FLD AUTO: 7.52 K/UL

## 2024-03-08 LAB — APO LP(A) SERPL-MCNC: 162.3 NMOL/L

## 2024-03-09 PROBLEM — E78.5 HYPERLIPIDEMIA: Status: ACTIVE | Noted: 2018-11-14

## 2024-03-09 NOTE — PLAN
[FreeTextEntry1] : 63Y w/ PMH of GERD, HLD, DM2 (A1c: 7.5; 03/2024) presents to the clinic for CPE.   #DM2 (A1c 7.5%)  - C/w metformin 1000 mg BID, Jardiance 25 mg QD  - CC diet reinforced - Albumin/Creatinine ratio  - Ophtho reinforced   #R. shoulder pain  Likely 2/2 supraspinatus tendinopathy  On PE: + empty can  Plan: - Naproxen 500 mg BID for 5 days  - Physical Therapy referral   #HCM  - Screenings UTD - Flu shot and Shringix performed at [facility]   Case discussed w/ Dr. Murphy  RTC in 3 months    Case discussed w/ Dr. Murphy

## 2024-03-09 NOTE — END OF VISIT
[] : Resident [FreeTextEntry3] :  Patient is a 63 year old female with a past medical history of GERD, HLD, DM2, who presents to the clinic for CPE. The patient endorses right shoulder pain that is worse with overhead activity and internal rotation as well as a abduction. She has no pain with adduction. she denies fever or chills. Her physical exam is notable for a +  Empty can test and a negative drop test. Her findings are concerning for supraspinatus tendinopathy w/o likely large tear. Differential diagnosis would also include long head bicep tendonpathy Who are soup subscapularis tendinopathy. Bursitis is less likely given lack of pain and other planes of motion. With regards to the patients' diagnosis of diabetes mellitus type 2 she is on an optimal regimen although would benefit from additional A1C lowering less than 7. - R shoulder pain: Trial of naproxen 500 milligrams BID for 5 days. PT referral provided - DM2: Will attempt lifestyle modifications more aggressively per patient preference, however if anyone's here remains above 7 would favor additional management with GLP -1 RA such as semaglutide.  - HLD: On atorvastatin 20mg qdaily above LDLc threshold of 100mg/dl at 111mg/dL  - HCM as above

## 2024-03-09 NOTE — PHYSICAL EXAM
[Normal] : normal gait, coordination grossly intact, no focal deficits and deep tendon reflexes were 2+ and symmetric [de-identified] : + empty can test

## 2024-03-09 NOTE — HISTORY OF PRESENT ILLNESS
[FreeTextEntry1] : CPE  [de-identified] : 63Y w/ PMH of GERD, HLD, DM2 (A1c: 7.5; 03/2024) presents to the clinic for CPE.   #DM2  Pt reports doing well and denies any acute complaints. Reports intermittently checking blood sugars at home (typical 's). Denies any hypoglycemic events and endorses a good diet (home cooked meals with veggies) and physically active as a . Pt adherent w/ metformin 1000 mg BID; Jardiance 25 mg QD. Denies fever, headache, nausea, vomiting, CP, SOB, changes in BM or dysuria.  #R. shoulder pain Endorses Non radiating, 5/10, sharp, R. shoulder pain that is worse with internal rotation and abduction for the past 2 weeks. Pain is improved with OTC voltaren gel, Tylenol, and ice. No known inciting factor. Denies trauma. Works as a . Denies associated paresthesia, tingling, weakness., erythema, or edema. Mobility reduced 2/2 pain.

## 2024-03-11 RX ORDER — METFORMIN HYDROCHLORIDE 1000 MG/1
1000 TABLET, COATED ORAL
Qty: 180 | Refills: 2 | Status: ACTIVE | COMMUNITY
Start: 2018-11-07 | End: 1900-01-01

## 2024-03-19 DIAGNOSIS — M25.511 PAIN IN RIGHT SHOULDER: ICD-10-CM

## 2024-03-19 DIAGNOSIS — E78.5 HYPERLIPIDEMIA, UNSPECIFIED: ICD-10-CM

## 2024-04-10 ENCOUNTER — NON-APPOINTMENT (OUTPATIENT)
Age: 64
End: 2024-04-10

## 2024-04-18 ENCOUNTER — APPOINTMENT (OUTPATIENT)
Dept: OBGYN | Facility: CLINIC | Age: 64
End: 2024-04-18

## 2024-06-18 ENCOUNTER — OUTPATIENT (OUTPATIENT)
Dept: OUTPATIENT SERVICES | Facility: HOSPITAL | Age: 64
LOS: 1 days | End: 2024-06-18
Payer: MEDICAID

## 2024-06-18 ENCOUNTER — APPOINTMENT (OUTPATIENT)
Dept: INTERNAL MEDICINE | Facility: CLINIC | Age: 64
End: 2024-06-18
Payer: MEDICAID

## 2024-06-18 VITALS
BODY MASS INDEX: 27.61 KG/M2 | SYSTOLIC BLOOD PRESSURE: 114 MMHG | OXYGEN SATURATION: 98 % | HEART RATE: 87 BPM | WEIGHT: 128 LBS | HEIGHT: 57 IN | DIASTOLIC BLOOD PRESSURE: 78 MMHG

## 2024-06-18 DIAGNOSIS — I10 ESSENTIAL (PRIMARY) HYPERTENSION: ICD-10-CM

## 2024-06-18 DIAGNOSIS — Z90.49 ACQUIRED ABSENCE OF OTHER SPECIFIED PARTS OF DIGESTIVE TRACT: Chronic | ICD-10-CM

## 2024-06-18 DIAGNOSIS — E11.9 TYPE 2 DIABETES MELLITUS W/OUT COMPLICATIONS: ICD-10-CM

## 2024-06-18 LAB — HBA1C MFR BLD HPLC: 7.7

## 2024-06-18 PROCEDURE — 99213 OFFICE O/P EST LOW 20 MIN: CPT

## 2024-06-18 PROCEDURE — 83036 HEMOGLOBIN GLYCOSYLATED A1C: CPT

## 2024-06-18 PROCEDURE — G0463: CPT

## 2024-06-18 RX ORDER — ATORVASTATIN CALCIUM 20 MG/1
20 TABLET, FILM COATED ORAL DAILY
Qty: 30 | Refills: 3 | Status: DISCONTINUED | COMMUNITY
Start: 2021-04-02 | End: 2024-06-18

## 2024-06-18 RX ORDER — ATORVASTATIN CALCIUM 40 MG/1
40 TABLET, FILM COATED ORAL
Qty: 1 | Refills: 1 | Status: ACTIVE | COMMUNITY
Start: 2024-06-18

## 2024-06-19 PROBLEM — E11.9 TYPE 2 DIABETES MELLITUS: Status: ACTIVE | Noted: 2024-03-11

## 2024-06-19 LAB
ESTIMATED AVERAGE GLUCOSE: 169 MG/DL
HBA1C MFR BLD HPLC: 7.5 %

## 2024-06-19 RX ORDER — DULAGLUTIDE 0.75 MG/.5ML
0.75 INJECTION, SOLUTION SUBCUTANEOUS
Qty: 2 | Refills: 3 | Status: ACTIVE | COMMUNITY
Start: 2024-06-19 | End: 1900-01-01

## 2024-06-20 NOTE — END OF VISIT
[] : Resident [FreeTextEntry3] : 63Y w/ PMH of GERD, HLD, DM2 (A1c: 7.5; 03/2024) presents to the clinic for A1c check. Found to have stable a1c between 7-8. Given age in 60s, patient would benefit from SGLT2i or GLP1 agonist for diabetes control and ASCVD risk reduction - Start Jardiance 25mg qdaily  - Recheck a1c in 3 months  - Lifestyle habits discussed and encouranged including diet and exercise.

## 2024-06-20 NOTE — ASSESSMENT
[FreeTextEntry1] : 63Y w/ PMH of GERD, HLD, DM2 (A1c: 7.5; 03/2024) presents to the clinic for A1c check  #DM2 Current Meds: Metformin 1000 mg BID; Jardiance 25 mg QD  POC A1c (06/2024) - 7.7%; Serum A1c (06/2024) - 7.5% (Repeated due to clinic discrepancies in POC)  Plan:  - Will add Jardiance 0.75 mg weekly to help optimize glycemic control (A1c < 7 %)  - Lifestyle modifications reinforced   #HLD  - C/w Lipitor 40 mg QD   Case discussed w/ Dr. Murphy

## 2024-06-20 NOTE — HISTORY OF PRESENT ILLNESS
[FreeTextEntry1] : A1c check  [de-identified] : 63Y w/ PMH of GERD, HLD, DM2 (A1c: 7.5; 03/2024) presents to the clinic for A1c check  #DM2 Current Meds: Metformin 1000 mg BID; Jardiance 25 mg QD  POC A1c (06/2024) - 7.7%; Serum A1c (06/2024) - 7.5% (Repeated due to clinic discrepancies in POC)  - Pt reports doing well and is in good spirits today. She states intermittently checking blood sugars at home w/ typical -150's. Endorses a healthy carb conscious diet (limited starch intake). Adherent w/ medication regimen. Denies blurry vision, headache, neuropathy, CP, SOB, polyuria/polydipsia, nausea, vomiting, changes in BM or dysuria. Screenings UTD.

## 2024-06-20 NOTE — INTERPRETER SERVICES
[[employer] ] : provided by [employer]   [Interpreters_IDNumber] : [id number] [TWNoteComboBox1] : Citizen of the [place]

## 2024-06-21 RX ORDER — PREDNISONE 20 MG/1
20 TABLET ORAL
Qty: 17 | Refills: 0 | Status: DISCONTINUED | COMMUNITY
Start: 2023-11-16 | End: 2024-06-21

## 2024-06-21 RX ORDER — LORATADINE 10 MG/1
10 TABLET ORAL
Qty: 31 | Refills: 0 | Status: DISCONTINUED | COMMUNITY
Start: 2023-11-16 | End: 2024-06-21

## 2024-06-21 RX ORDER — NAPROXEN 500 MG/1
500 TABLET ORAL
Qty: 10 | Refills: 0 | Status: DISCONTINUED | COMMUNITY
Start: 2024-03-05 | End: 2024-06-21

## 2024-06-24 DIAGNOSIS — E11.9 TYPE 2 DIABETES MELLITUS WITHOUT COMPLICATIONS: ICD-10-CM

## 2024-07-03 ENCOUNTER — APPOINTMENT (OUTPATIENT)
Dept: MAMMOGRAPHY | Facility: IMAGING CENTER | Age: 64
End: 2024-07-03
Payer: MEDICAID

## 2024-07-03 ENCOUNTER — RESULT REVIEW (OUTPATIENT)
Age: 64
End: 2024-07-03

## 2024-07-03 ENCOUNTER — OUTPATIENT (OUTPATIENT)
Dept: OUTPATIENT SERVICES | Facility: HOSPITAL | Age: 64
LOS: 1 days | End: 2024-07-03
Payer: MEDICAID

## 2024-07-03 DIAGNOSIS — Z00.00 ENCOUNTER FOR GENERAL ADULT MEDICAL EXAMINATION WITHOUT ABNORMAL FINDINGS: ICD-10-CM

## 2024-07-03 DIAGNOSIS — Z90.49 ACQUIRED ABSENCE OF OTHER SPECIFIED PARTS OF DIGESTIVE TRACT: Chronic | ICD-10-CM

## 2024-07-03 PROCEDURE — 77063 BREAST TOMOSYNTHESIS BI: CPT

## 2024-07-03 PROCEDURE — 77063 BREAST TOMOSYNTHESIS BI: CPT | Mod: 26

## 2024-07-03 PROCEDURE — 77067 SCR MAMMO BI INCL CAD: CPT | Mod: 26

## 2024-07-03 PROCEDURE — 77067 SCR MAMMO BI INCL CAD: CPT

## 2024-07-09 ENCOUNTER — APPOINTMENT (OUTPATIENT)
Dept: MAMMOGRAPHY | Facility: IMAGING CENTER | Age: 64
End: 2024-07-09

## 2024-07-12 ENCOUNTER — APPOINTMENT (OUTPATIENT)
Dept: ULTRASOUND IMAGING | Facility: IMAGING CENTER | Age: 64
End: 2024-07-12
Payer: MEDICAID

## 2024-07-12 ENCOUNTER — RESULT REVIEW (OUTPATIENT)
Age: 64
End: 2024-07-12

## 2024-07-12 ENCOUNTER — OUTPATIENT (OUTPATIENT)
Dept: OUTPATIENT SERVICES | Facility: HOSPITAL | Age: 64
LOS: 1 days | End: 2024-07-12
Payer: MEDICAID

## 2024-07-12 ENCOUNTER — APPOINTMENT (OUTPATIENT)
Dept: MAMMOGRAPHY | Facility: IMAGING CENTER | Age: 64
End: 2024-07-12
Payer: MEDICAID

## 2024-07-12 DIAGNOSIS — Z00.8 ENCOUNTER FOR OTHER GENERAL EXAMINATION: ICD-10-CM

## 2024-07-12 DIAGNOSIS — Z90.49 ACQUIRED ABSENCE OF OTHER SPECIFIED PARTS OF DIGESTIVE TRACT: Chronic | ICD-10-CM

## 2024-07-12 PROCEDURE — 77062 BREAST TOMOSYNTHESIS BI: CPT | Mod: 26

## 2024-07-12 PROCEDURE — 76642 ULTRASOUND BREAST LIMITED: CPT

## 2024-07-12 PROCEDURE — 77066 DX MAMMO INCL CAD BI: CPT | Mod: 26

## 2024-07-12 PROCEDURE — 76642 ULTRASOUND BREAST LIMITED: CPT | Mod: 26,LT

## 2024-07-12 PROCEDURE — 77066 DX MAMMO INCL CAD BI: CPT

## 2024-07-12 PROCEDURE — G0279: CPT

## 2024-10-01 ENCOUNTER — OUTPATIENT (OUTPATIENT)
Dept: OUTPATIENT SERVICES | Facility: HOSPITAL | Age: 64
LOS: 1 days | End: 2024-10-01
Payer: MEDICAID

## 2024-10-01 ENCOUNTER — APPOINTMENT (OUTPATIENT)
Dept: INTERNAL MEDICINE | Facility: CLINIC | Age: 64
End: 2024-10-01
Payer: MEDICAID

## 2024-10-01 ENCOUNTER — MED ADMIN CHARGE (OUTPATIENT)
Age: 64
End: 2024-10-01

## 2024-10-01 VITALS
WEIGHT: 124 LBS | OXYGEN SATURATION: 97 % | DIASTOLIC BLOOD PRESSURE: 76 MMHG | SYSTOLIC BLOOD PRESSURE: 100 MMHG | HEART RATE: 90 BPM | BODY MASS INDEX: 26.75 KG/M2 | HEIGHT: 57 IN

## 2024-10-01 DIAGNOSIS — E78.5 HYPERLIPIDEMIA, UNSPECIFIED: ICD-10-CM

## 2024-10-01 DIAGNOSIS — E11.9 TYPE 2 DIABETES MELLITUS W/OUT COMPLICATIONS: ICD-10-CM

## 2024-10-01 DIAGNOSIS — I10 ESSENTIAL (PRIMARY) HYPERTENSION: ICD-10-CM

## 2024-10-01 PROCEDURE — G0463: CPT

## 2024-10-01 PROCEDURE — 83036 HEMOGLOBIN GLYCOSYLATED A1C: CPT

## 2024-10-01 PROCEDURE — G0008: CPT

## 2024-10-01 PROCEDURE — 90656 IIV3 VACC NO PRSV 0.5 ML IM: CPT

## 2024-10-01 PROCEDURE — 99213 OFFICE O/P EST LOW 20 MIN: CPT | Mod: GE

## 2024-10-02 LAB — HBA1C MFR BLD HPLC: 6.7

## 2024-10-02 NOTE — HISTORY OF PRESENT ILLNESS
[FreeTextEntry1] : A1c check  [de-identified] : 63Y w/ PMH of GERD, HLD, DM2 (A1c: 7.5; 06/2024) presents to the clinic for A1c check  #DM2  A1c - 6.7  Meds: Trulicity 0.75 weekly, metformin 1000 mg BID, jardiance 25 mg QD  Pt reports feeling great. Has been checking blood sugar at home. Lowest FS - 90 (asymptomatic); Highest FS - 200 (one time); primarily -130. Endorses a good diet (carb limited). Physically active. Last saw optho in Nov 2023; foot exam wnl. Denies CP, SOB, fever, nausea, vomiting, blurry vision or changes in BM/dysuria.   #HLD  Controlled on Lipitor

## 2024-10-02 NOTE — ASSESSMENT
[FreeTextEntry1] : 63Y w/ PMH of GERD, HLD, DM2 (A1c: 7.5; 06/2024) presents to the clinic for A1c check  #DM2  A1c - 6.7  Meds: Trulicity 0.75 weekly, metformin 1000 mg BID, jardiance 25 mg QD  - Will refill Jardiance, DM2 test strips  - C/w meds above; foot exam wnl; urine albumin/creatinine UTD  - Optho referral at next visit (due in Nov/Dec)   #HLD  - C/w Lipitor   #Immunization  - Flu Shot today   Case discussed w/ Dr. Swain RTC in 3 months (if A1c stable at next visit; can have A1c checks every 6 months)

## 2024-10-02 NOTE — HISTORY OF PRESENT ILLNESS
[FreeTextEntry1] : A1c check  [de-identified] : 63Y w/ PMH of GERD, HLD, DM2 (A1c: 7.5; 06/2024) presents to the clinic for A1c check  #DM2  A1c - 6.7  Meds: Trulicity 0.75 weekly, metformin 1000 mg BID, jardiance 25 mg QD  Pt reports feeling great. Has been checking blood sugar at home. Lowest FS - 90 (asymptomatic); Highest FS - 200 (one time); primarily -130. Endorses a good diet (carb limited). Physically active. Last saw optho in Nov 2023; foot exam wnl. Denies CP, SOB, fever, nausea, vomiting, blurry vision or changes in BM/dysuria.   #HLD  Controlled on Lipitor  Detail Level: Detailed

## 2024-10-07 DIAGNOSIS — E78.5 HYPERLIPIDEMIA, UNSPECIFIED: ICD-10-CM

## 2024-10-07 DIAGNOSIS — Z23 ENCOUNTER FOR IMMUNIZATION: ICD-10-CM

## 2024-10-07 DIAGNOSIS — E11.9 TYPE 2 DIABETES MELLITUS WITHOUT COMPLICATIONS: ICD-10-CM

## 2024-10-16 DIAGNOSIS — E11.9 TYPE 2 DIABETES MELLITUS W/OUT COMPLICATIONS: ICD-10-CM

## 2024-11-25 NOTE — ED PROVIDER NOTE - NS ED MD DISPO DISCHARGE
Prepped: groin. Prepped with: ChloraPrep. The patient was draped. .Pre-procedure site marking:Insertion site not predetermined Home

## 2025-01-14 ENCOUNTER — OUTPATIENT (OUTPATIENT)
Dept: OUTPATIENT SERVICES | Facility: HOSPITAL | Age: 65
LOS: 1 days | End: 2025-01-14
Payer: MEDICAID

## 2025-01-14 ENCOUNTER — APPOINTMENT (OUTPATIENT)
Dept: INTERNAL MEDICINE | Facility: CLINIC | Age: 65
End: 2025-01-14
Payer: MEDICAID

## 2025-01-14 VITALS
DIASTOLIC BLOOD PRESSURE: 70 MMHG | BODY MASS INDEX: 28.26 KG/M2 | WEIGHT: 131 LBS | HEART RATE: 95 BPM | SYSTOLIC BLOOD PRESSURE: 110 MMHG | HEIGHT: 57 IN | OXYGEN SATURATION: 98 %

## 2025-01-14 DIAGNOSIS — E11.9 TYPE 2 DIABETES MELLITUS W/OUT COMPLICATIONS: ICD-10-CM

## 2025-01-14 DIAGNOSIS — I10 ESSENTIAL (PRIMARY) HYPERTENSION: ICD-10-CM

## 2025-01-14 DIAGNOSIS — E78.5 HYPERLIPIDEMIA, UNSPECIFIED: ICD-10-CM

## 2025-01-14 DIAGNOSIS — Z90.49 ACQUIRED ABSENCE OF OTHER SPECIFIED PARTS OF DIGESTIVE TRACT: Chronic | ICD-10-CM

## 2025-01-14 LAB — HBA1C MFR BLD HPLC: 7.1

## 2025-01-14 PROCEDURE — G0463: CPT

## 2025-01-14 PROCEDURE — 83036 HEMOGLOBIN GLYCOSYLATED A1C: CPT

## 2025-01-14 PROCEDURE — 99213 OFFICE O/P EST LOW 20 MIN: CPT | Mod: GE

## 2025-01-14 RX ORDER — DULAGLUTIDE 0.75 MG/.5ML
0.75 INJECTION, SOLUTION SUBCUTANEOUS
Qty: 4 | Refills: 6 | Status: ACTIVE | COMMUNITY
Start: 2025-01-14 | End: 1900-01-01

## 2025-01-22 DIAGNOSIS — E78.5 HYPERLIPIDEMIA, UNSPECIFIED: ICD-10-CM

## 2025-01-22 DIAGNOSIS — E11.9 TYPE 2 DIABETES MELLITUS WITHOUT COMPLICATIONS: ICD-10-CM

## 2025-06-18 ENCOUNTER — OUTPATIENT (OUTPATIENT)
Dept: OUTPATIENT SERVICES | Facility: HOSPITAL | Age: 65
LOS: 1 days | End: 2025-06-18

## 2025-06-18 ENCOUNTER — APPOINTMENT (OUTPATIENT)
Dept: INTERNAL MEDICINE | Facility: CLINIC | Age: 65
End: 2025-06-18

## 2025-06-18 VITALS
WEIGHT: 130.38 LBS | DIASTOLIC BLOOD PRESSURE: 76 MMHG | OXYGEN SATURATION: 98 % | SYSTOLIC BLOOD PRESSURE: 100 MMHG | BODY MASS INDEX: 28.13 KG/M2 | HEART RATE: 89 BPM | HEIGHT: 57 IN

## 2025-06-18 DIAGNOSIS — Z90.49 ACQUIRED ABSENCE OF OTHER SPECIFIED PARTS OF DIGESTIVE TRACT: Chronic | ICD-10-CM

## 2025-06-18 PROCEDURE — G2211 COMPLEX E/M VISIT ADD ON: CPT | Mod: NC

## 2025-06-18 PROCEDURE — 99213 OFFICE O/P EST LOW 20 MIN: CPT | Mod: GC

## 2025-06-18 RX ORDER — TERBINAFINE HYDROCHLORIDE 1 G/100G
1 CREAM TOPICAL 3 TIMES DAILY
Qty: 2 | Refills: 0 | Status: ACTIVE | COMMUNITY
Start: 2025-06-18

## 2025-06-18 RX ORDER — TERBINAFINE HCL 100 %
POWDER (GRAM) MISCELLANEOUS
Qty: 5 | Refills: 0 | Status: ACTIVE | COMMUNITY
Start: 2025-06-18

## 2025-06-23 LAB
24R-OH-CALCIDIOL SERPL-MCNC: 60.4 PG/ML
ALBUMIN SERPL ELPH-MCNC: 3.8 G/DL
ALP BLD-CCNC: 91 U/L
ALT SERPL-CCNC: 56 U/L
ANION GAP SERPL CALC-SCNC: 16 MMOL/L
AST SERPL-CCNC: 29 U/L
BILIRUB SERPL-MCNC: 0.5 MG/DL
BUN SERPL-MCNC: 17 MG/DL
CALCIUM SERPL-MCNC: 9.9 MG/DL
CHLORIDE SERPL-SCNC: 104 MMOL/L
CHOLEST SERPL-MCNC: 174 MG/DL
CO2 SERPL-SCNC: 23 MMOL/L
CREAT SERPL-MCNC: 0.49 MG/DL
CREAT SPEC-SCNC: 141 MG/DL
CREAT/PROT UR: 0.1 RATIO
EGFRCR SERPLBLD CKD-EPI 2021: 105 ML/MIN/1.73M2
ESTIMATED AVERAGE GLUCOSE: 189 MG/DL
GLUCOSE SERPL-MCNC: 94 MG/DL
HBA1C MFR BLD HPLC: 8.2 %
HCT VFR BLD CALC: 44.9 %
HDLC SERPL-MCNC: 63 MG/DL
HGB BLD-MCNC: 15 G/DL
LDLC SERPL-MCNC: 98 MG/DL
MCHC RBC-ENTMCNC: 29.9 PG
MCHC RBC-ENTMCNC: 33.4 G/DL
MCV RBC AUTO: 89.4 FL
NONHDLC SERPL-MCNC: 110 MG/DL
PLATELET # BLD AUTO: 266 K/UL
POTASSIUM SERPL-SCNC: 3.8 MMOL/L
PROT SERPL-MCNC: 6 G/DL
PROT UR-MCNC: 12 MG/DL
RBC # BLD: 5.02 M/UL
RBC # FLD: 12.6 %
SODIUM SERPL-SCNC: 142 MMOL/L
TRIGL SERPL-MCNC: 68 MG/DL
TSH SERPL-ACNC: 2.87 UIU/ML
WBC # FLD AUTO: 9.08 K/UL

## 2025-07-15 ENCOUNTER — APPOINTMENT (OUTPATIENT)
Dept: MAMMOGRAPHY | Facility: IMAGING CENTER | Age: 65
End: 2025-07-15
Payer: MEDICAID

## 2025-07-15 ENCOUNTER — OUTPATIENT (OUTPATIENT)
Dept: OUTPATIENT SERVICES | Facility: HOSPITAL | Age: 65
LOS: 1 days | End: 2025-07-15
Payer: MEDICAID

## 2025-07-15 ENCOUNTER — RESULT REVIEW (OUTPATIENT)
Age: 65
End: 2025-07-15

## 2025-07-15 DIAGNOSIS — Z00.8 ENCOUNTER FOR OTHER GENERAL EXAMINATION: ICD-10-CM

## 2025-07-15 DIAGNOSIS — Z90.49 ACQUIRED ABSENCE OF OTHER SPECIFIED PARTS OF DIGESTIVE TRACT: Chronic | ICD-10-CM

## 2025-07-15 PROCEDURE — 77067 SCR MAMMO BI INCL CAD: CPT

## 2025-07-15 PROCEDURE — 77067 SCR MAMMO BI INCL CAD: CPT | Mod: 26

## 2025-07-15 PROCEDURE — 77063 BREAST TOMOSYNTHESIS BI: CPT | Mod: 26

## 2025-07-15 PROCEDURE — 77063 BREAST TOMOSYNTHESIS BI: CPT

## 2025-07-25 ENCOUNTER — OUTPATIENT (OUTPATIENT)
Dept: OUTPATIENT SERVICES | Facility: HOSPITAL | Age: 65
LOS: 1 days | End: 2025-07-25

## 2025-07-25 ENCOUNTER — APPOINTMENT (OUTPATIENT)
Dept: INTERNAL MEDICINE | Facility: CLINIC | Age: 65
End: 2025-07-25

## 2025-07-25 VITALS
DIASTOLIC BLOOD PRESSURE: 66 MMHG | SYSTOLIC BLOOD PRESSURE: 104 MMHG | HEIGHT: 57 IN | OXYGEN SATURATION: 98 % | BODY MASS INDEX: 26.75 KG/M2 | HEART RATE: 90 BPM | WEIGHT: 124 LBS

## 2025-07-25 DIAGNOSIS — Z90.49 ACQUIRED ABSENCE OF OTHER SPECIFIED PARTS OF DIGESTIVE TRACT: Chronic | ICD-10-CM

## 2025-07-25 DIAGNOSIS — Z00.00 ENCOUNTER FOR GENERAL ADULT MEDICAL EXAMINATION W/OUT ABNORMAL FINDINGS: ICD-10-CM

## 2025-07-25 DIAGNOSIS — N89.8 OTHER SPECIFIED NONINFLAMMATORY DISORDERS OF VAGINA: ICD-10-CM

## 2025-07-25 DIAGNOSIS — N95.1 MENOPAUSAL AND FEMALE CLIMACTERIC STATES: ICD-10-CM

## 2025-07-25 DIAGNOSIS — B35.6 TINEA CRURIS: ICD-10-CM

## 2025-07-25 DIAGNOSIS — I10 ESSENTIAL (PRIMARY) HYPERTENSION: ICD-10-CM

## 2025-07-25 DIAGNOSIS — B35.3 TINEA PEDIS: ICD-10-CM

## 2025-07-25 DIAGNOSIS — E78.5 HYPERLIPIDEMIA, UNSPECIFIED: ICD-10-CM

## 2025-07-25 PROCEDURE — G0463: CPT

## 2025-07-25 PROCEDURE — 99396 PREV VISIT EST AGE 40-64: CPT | Mod: GC

## 2025-07-25 RX ORDER — ESTRADIOL 0.1 MG/G
0.1 CREAM VAGINAL AT BEDTIME
Qty: 2 | Refills: 2 | Status: ACTIVE | COMMUNITY
Start: 2025-07-25 | End: 1900-01-01

## 2025-07-25 RX ORDER — NYSTATIN 100000 [USP'U]/G
100000 POWDER TOPICAL AT BEDTIME
Qty: 2 | Refills: 0 | Status: ACTIVE | COMMUNITY
Start: 2025-07-25 | End: 1900-01-01

## 2025-07-25 RX ORDER — KETOCONAZOLE 20 MG/G
2 CREAM TOPICAL DAILY
Qty: 2 | Refills: 0 | Status: ACTIVE | COMMUNITY
Start: 2025-07-25 | End: 1900-01-01

## 2025-07-25 RX ORDER — TERBINAFINE HYDROCHLORIDE 1 G/100G
1 CREAM TOPICAL 3 TIMES DAILY
Qty: 2 | Refills: 0 | Status: ACTIVE | COMMUNITY
Start: 2025-07-25 | End: 1900-01-01

## 2025-07-25 RX ORDER — PETROLATUM,WHITE 41 %
OINTMENT (GRAM) TOPICAL DAILY
Qty: 2 | Refills: 0 | Status: ACTIVE | COMMUNITY
Start: 2025-07-25 | End: 1900-01-01

## 2025-08-11 ENCOUNTER — APPOINTMENT (OUTPATIENT)
Dept: OBGYN | Facility: CLINIC | Age: 65
End: 2025-08-11

## 2025-08-29 ENCOUNTER — OUTPATIENT (OUTPATIENT)
Dept: OUTPATIENT SERVICES | Facility: HOSPITAL | Age: 65
LOS: 1 days | End: 2025-08-29
Payer: MEDICAID

## 2025-08-29 ENCOUNTER — APPOINTMENT (OUTPATIENT)
Dept: OBGYN | Facility: CLINIC | Age: 65
End: 2025-08-29
Payer: MEDICAID

## 2025-08-29 ENCOUNTER — LABORATORY RESULT (OUTPATIENT)
Age: 65
End: 2025-08-29

## 2025-08-29 VITALS — SYSTOLIC BLOOD PRESSURE: 108 MMHG | DIASTOLIC BLOOD PRESSURE: 70 MMHG | BODY MASS INDEX: 26.83 KG/M2 | WEIGHT: 124 LBS

## 2025-08-29 DIAGNOSIS — N95.1 MENOPAUSAL AND FEMALE CLIMACTERIC STATES: ICD-10-CM

## 2025-08-29 DIAGNOSIS — Z90.49 ACQUIRED ABSENCE OF OTHER SPECIFIED PARTS OF DIGESTIVE TRACT: Chronic | ICD-10-CM

## 2025-08-29 DIAGNOSIS — N76.0 ACUTE VAGINITIS: ICD-10-CM

## 2025-08-29 DIAGNOSIS — Z01.419 ENCOUNTER FOR GYNECOLOGICAL EXAMINATION (GENERAL) (ROUTINE) W/OUT ABNORMAL FINDINGS: ICD-10-CM

## 2025-08-29 DIAGNOSIS — Z01.419 ENCOUNTER FOR GYNECOLOGICAL EXAMINATION (GENERAL) (ROUTINE) WITHOUT ABNORMAL FINDINGS: ICD-10-CM

## 2025-08-29 PROCEDURE — G0444 DEPRESSION SCREEN ANNUAL: CPT | Mod: 59

## 2025-08-29 PROCEDURE — G0463: CPT

## 2025-08-29 PROCEDURE — 99386 PREV VISIT NEW AGE 40-64: CPT

## 2025-08-29 RX ORDER — ESTRADIOL 0.1 MG/G
0.1 CREAM VAGINAL
Qty: 1 | Refills: 4 | Status: ACTIVE | COMMUNITY
Start: 2025-08-29 | End: 1900-01-01

## 2025-09-03 ENCOUNTER — OUTPATIENT (OUTPATIENT)
Dept: OUTPATIENT SERVICES | Facility: HOSPITAL | Age: 65
LOS: 1 days | End: 2025-09-03
Payer: MEDICAID

## 2025-09-03 ENCOUNTER — APPOINTMENT (OUTPATIENT)
Dept: INTERNAL MEDICINE | Facility: CLINIC | Age: 65
End: 2025-09-03
Payer: MEDICAID

## 2025-09-03 VITALS
SYSTOLIC BLOOD PRESSURE: 132 MMHG | BODY MASS INDEX: 26.75 KG/M2 | HEIGHT: 57 IN | DIASTOLIC BLOOD PRESSURE: 64 MMHG | HEART RATE: 93 BPM | WEIGHT: 124 LBS | OXYGEN SATURATION: 98 %

## 2025-09-03 DIAGNOSIS — Z90.49 ACQUIRED ABSENCE OF OTHER SPECIFIED PARTS OF DIGESTIVE TRACT: Chronic | ICD-10-CM

## 2025-09-03 DIAGNOSIS — E11.9 TYPE 2 DIABETES MELLITUS W/OUT COMPLICATIONS: ICD-10-CM

## 2025-09-03 DIAGNOSIS — I10 ESSENTIAL (PRIMARY) HYPERTENSION: ICD-10-CM

## 2025-09-03 LAB — CYTOLOGY SPEC DOC CYTO: SIGNIFICANT CHANGE UP

## 2025-09-03 PROCEDURE — G0463: CPT

## 2025-09-03 PROCEDURE — 99213 OFFICE O/P EST LOW 20 MIN: CPT | Mod: GC

## 2025-09-03 PROCEDURE — 83036 HEMOGLOBIN GLYCOSYLATED A1C: CPT

## 2025-09-03 RX ORDER — BLOOD-GLUCOSE METER
W/DEVICE KIT MISCELLANEOUS
Qty: 1 | Refills: 0 | Status: ACTIVE | COMMUNITY
Start: 2025-09-03 | End: 1900-01-01

## 2025-09-04 LAB — HBA1C MFR BLD HPLC: 6.7

## 2025-09-09 DIAGNOSIS — I10 ESSENTIAL (PRIMARY) HYPERTENSION: ICD-10-CM

## 2025-09-17 DIAGNOSIS — E11.9 TYPE 2 DIABETES MELLITUS WITHOUT COMPLICATIONS: ICD-10-CM
